# Patient Record
Sex: FEMALE | Race: ASIAN | NOT HISPANIC OR LATINO | URBAN - METROPOLITAN AREA
[De-identification: names, ages, dates, MRNs, and addresses within clinical notes are randomized per-mention and may not be internally consistent; named-entity substitution may affect disease eponyms.]

---

## 2023-05-23 ENCOUNTER — INPATIENT (INPATIENT)
Facility: HOSPITAL | Age: 68
LOS: 9 days | Discharge: NURSING, REHAB OR LONG-TERM CARE | DRG: 536 | End: 2023-06-02
Attending: HOSPITALIST | Admitting: STUDENT IN AN ORGANIZED HEALTH CARE EDUCATION/TRAINING PROGRAM
Payer: MEDICARE

## 2023-05-23 VITALS
DIASTOLIC BLOOD PRESSURE: 85 MMHG | RESPIRATION RATE: 18 BRPM | HEART RATE: 80 BPM | TEMPERATURE: 98 F | SYSTOLIC BLOOD PRESSURE: 168 MMHG | OXYGEN SATURATION: 98 %

## 2023-05-23 LAB
ALBUMIN SERPL ELPH-MCNC: 3.7 G/DL — SIGNIFICANT CHANGE UP (ref 3.3–5)
ALP SERPL-CCNC: 97 U/L — SIGNIFICANT CHANGE UP (ref 40–120)
ALT FLD-CCNC: 24 U/L — SIGNIFICANT CHANGE UP (ref 12–78)
ANION GAP SERPL CALC-SCNC: 6 MMOL/L — SIGNIFICANT CHANGE UP (ref 5–17)
APPEARANCE UR: CLEAR — SIGNIFICANT CHANGE UP
APTT BLD: 33.4 SEC — SIGNIFICANT CHANGE UP (ref 27.5–35.5)
AST SERPL-CCNC: 33 U/L — SIGNIFICANT CHANGE UP (ref 15–37)
BACTERIA # UR AUTO: ABNORMAL
BILIRUB SERPL-MCNC: 1 MG/DL — SIGNIFICANT CHANGE UP (ref 0.2–1.2)
BILIRUB UR-MCNC: NEGATIVE — SIGNIFICANT CHANGE UP
BLD GP AB SCN SERPL QL: SIGNIFICANT CHANGE UP
BUN SERPL-MCNC: 19 MG/DL — SIGNIFICANT CHANGE UP (ref 7–23)
CALCIUM SERPL-MCNC: 9.3 MG/DL — SIGNIFICANT CHANGE UP (ref 8.5–10.1)
CHLORIDE SERPL-SCNC: 105 MMOL/L — SIGNIFICANT CHANGE UP (ref 96–108)
CK SERPL-CCNC: 346 U/L — HIGH (ref 26–192)
CO2 SERPL-SCNC: 24 MMOL/L — SIGNIFICANT CHANGE UP (ref 22–31)
COLOR SPEC: YELLOW — SIGNIFICANT CHANGE UP
CREAT SERPL-MCNC: 0.67 MG/DL — SIGNIFICANT CHANGE UP (ref 0.5–1.3)
DIFF PNL FLD: NEGATIVE — SIGNIFICANT CHANGE UP
EGFR: 96 ML/MIN/1.73M2 — SIGNIFICANT CHANGE UP
EPI CELLS # UR: SIGNIFICANT CHANGE UP
GLUCOSE SERPL-MCNC: 109 MG/DL — HIGH (ref 70–99)
GLUCOSE UR QL: NEGATIVE — SIGNIFICANT CHANGE UP
HCT VFR BLD CALC: 27.9 % — LOW (ref 34.5–45)
HGB BLD-MCNC: 8.7 G/DL — LOW (ref 11.5–15.5)
INR BLD: 1.11 RATIO — SIGNIFICANT CHANGE UP (ref 0.88–1.16)
KETONES UR-MCNC: NEGATIVE — SIGNIFICANT CHANGE UP
LEUKOCYTE ESTERASE UR-ACNC: ABNORMAL
MCHC RBC-ENTMCNC: 27.7 PG — SIGNIFICANT CHANGE UP (ref 27–34)
MCHC RBC-ENTMCNC: 31.2 GM/DL — LOW (ref 32–36)
MCV RBC AUTO: 88.9 FL — SIGNIFICANT CHANGE UP (ref 80–100)
NITRITE UR-MCNC: NEGATIVE — SIGNIFICANT CHANGE UP
PH UR: 5 — SIGNIFICANT CHANGE UP (ref 5–8)
PLATELET # BLD AUTO: 167 K/UL — SIGNIFICANT CHANGE UP (ref 150–400)
POTASSIUM SERPL-MCNC: 3.7 MMOL/L — SIGNIFICANT CHANGE UP (ref 3.5–5.3)
POTASSIUM SERPL-SCNC: 3.7 MMOL/L — SIGNIFICANT CHANGE UP (ref 3.5–5.3)
PROT SERPL-MCNC: 8.2 G/DL — SIGNIFICANT CHANGE UP (ref 6–8.3)
PROT UR-MCNC: NEGATIVE — SIGNIFICANT CHANGE UP
PROTHROM AB SERPL-ACNC: 13 SEC — SIGNIFICANT CHANGE UP (ref 10.5–13.4)
RBC # BLD: 3.14 M/UL — LOW (ref 3.8–5.2)
RBC # FLD: 17.5 % — HIGH (ref 10.3–14.5)
RBC CASTS # UR COMP ASSIST: ABNORMAL /HPF (ref 0–4)
SODIUM SERPL-SCNC: 135 MMOL/L — SIGNIFICANT CHANGE UP (ref 135–145)
SP GR SPEC: 1.01 — SIGNIFICANT CHANGE UP (ref 1.01–1.02)
UROBILINOGEN FLD QL: NEGATIVE — SIGNIFICANT CHANGE UP
WBC # BLD: 20.9 K/UL — HIGH (ref 3.8–10.5)
WBC # FLD AUTO: 20.9 K/UL — HIGH (ref 3.8–10.5)
WBC UR QL: SIGNIFICANT CHANGE UP

## 2023-05-23 PROCEDURE — 73552 X-RAY EXAM OF FEMUR 2/>: CPT | Mod: 26,RT

## 2023-05-23 PROCEDURE — 99285 EMERGENCY DEPT VISIT HI MDM: CPT

## 2023-05-23 PROCEDURE — G1004: CPT

## 2023-05-23 PROCEDURE — 72125 CT NECK SPINE W/O DYE: CPT | Mod: 26,MG

## 2023-05-23 PROCEDURE — 93010 ELECTROCARDIOGRAM REPORT: CPT

## 2023-05-23 PROCEDURE — 71045 X-RAY EXAM CHEST 1 VIEW: CPT | Mod: 26

## 2023-05-23 PROCEDURE — 73030 X-RAY EXAM OF SHOULDER: CPT | Mod: 26,RT

## 2023-05-23 PROCEDURE — 73502 X-RAY EXAM HIP UNI 2-3 VIEWS: CPT | Mod: 26,RT

## 2023-05-23 RX ORDER — MORPHINE SULFATE 50 MG/1
4 CAPSULE, EXTENDED RELEASE ORAL ONCE
Refills: 0 | Status: DISCONTINUED | OUTPATIENT
Start: 2023-05-23 | End: 2023-05-23

## 2023-05-23 RX ORDER — SODIUM CHLORIDE 9 MG/ML
1000 INJECTION INTRAMUSCULAR; INTRAVENOUS; SUBCUTANEOUS ONCE
Refills: 0 | Status: COMPLETED | OUTPATIENT
Start: 2023-05-23 | End: 2023-05-23

## 2023-05-23 RX ADMIN — MORPHINE SULFATE 4 MILLIGRAM(S): 50 CAPSULE, EXTENDED RELEASE ORAL at 23:29

## 2023-05-23 RX ADMIN — MORPHINE SULFATE 4 MILLIGRAM(S): 50 CAPSULE, EXTENDED RELEASE ORAL at 23:44

## 2023-05-23 RX ADMIN — SODIUM CHLORIDE 1000 MILLILITER(S): 9 INJECTION INTRAMUSCULAR; INTRAVENOUS; SUBCUTANEOUS at 23:11

## 2023-05-23 NOTE — ED ADULT NURSE NOTE - OBJECTIVE STATEMENT
Stated she fell over a hose at home in front of her home on concrete> unable to recall if she hit head>denies pain to this area, denies LOC  but she fell on right side and was unable to stand. Complaining of pain in right neck, right shoulder, and right  buttock. Pt stated she takes aspirin> h/o valve replacement> stated she too tylenol 500 mg before arrival

## 2023-05-23 NOTE — ED PROVIDER NOTE - DIFFERENTIAL DIAGNOSIS
Rule out right hip fracture, acute traumatic head injury, cervical spine fracture, other acute pathology Differential Diagnosis

## 2023-05-23 NOTE — ED PROVIDER NOTE - CLINICAL SUMMARY MEDICAL DECISION MAKING FREE TEXT BOX
Right hip injury, head injury status post mechanical fall.  Will check labs, x-ray, CT, orthopedics, admission

## 2023-05-23 NOTE — ED ADULT NURSE NOTE - NSFALLRISKINTERV_ED_ALL_ED
Assistance OOB with selected safe patient handling equipment if applicable/Assistance with ambulation/Communicate fall risk and risk factors to all staff, patient, and family/Monitor gait and stability/Provide visual cue: yellow wristband, yellow gown, etc/Reinforce activity limits and safety measures with patient and family/Toileting schedule using arm’s reach rule for commode and bathroom/Call bell, personal items and telephone in reach/Instruct patient to call for assistance before getting out of bed/chair/stretcher/Non-slip footwear applied when patient is off stretcher/[place] to call system/Physically safe environment - no spills, clutter or unnecessary equipment/Purposeful Proactive Rounding/Room/bathroom lighting operational, light cord in reach

## 2023-05-23 NOTE — ED ADULT NURSE NOTE - ABDOMEN
What Type Of Note Output Would You Prefer (Optional)?: Bullet Format How Severe Is Your Skin Lesion?: mild Has Your Skin Lesion Been Treated?: not been treated Is This A New Presentation, Or A Follow-Up?: Skin Lesion Additional History: Last seen October 2019 soft/nondistended

## 2023-05-23 NOTE — ED PROVIDER NOTE - PHYSICAL EXAMINATION
· CONSTITUTIONAL: Well appearing, well nourished, awake, alert, oriented to person, place, time/situation and in no apparent distress. non-toxic, well appearing.   · ENMT: Airway patent, Nasal mucosa clear. Mouth with normal mucosa. Throat has no vesicles, no oropharyngeal exudates and uvula is midline. MM moist.  no external signs of head trauma.  · EYES: Clear bilaterally, pupils equal, round and reactive to light. Extra-ocular muscles intact.  · CARDIAC: Normal rate, regular rhythm.  Heart sounds S1, S2.  No murmurs, rubs or gallops.  · RESPIRATORY: Breath sounds clear and equal bilaterally. nl resp effort.  No Wheeze / Rhonci / Rales.  · GASTROINTESTINAL: Abdomen soft, non-tender, no guarding. non-distended. no hsm. no CVA tenderness. no acute signs of truncal trauma.  · GENITOURINARY:  Bladder: non-tender / non-distended  · MUSCULOSKELETAL: Spine appears normal, No spinal tenderness (Cervical, thoracic, Lumbar). Range of motion in all extremities is not limited, no muscle or joint tenderness except as noted  Positive tenderness to right hip, medial aspect with decreased range of motion due to pain.  Normal distal femur, knee, tib-fib.  Mild pain with full range of motion of right shoulder.  Normal clavicle bilaterally.  · NEUROLOGICAL: Alert and oriented, no focal deficits, no motor or sensory deficits. Normal, non-focal detailed neurologic exam.  · SKIN: Skin normal color for race, warm, dry and intact. No evidence of rash.  No external signs of head trauma, minimal tenderness to right posterior lateral scalp.

## 2023-05-23 NOTE — ED PROVIDER NOTE - OBJECTIVE STATEMENT
68-year-old female with a history of hypertension, status post aortic valve replacement-bovine presents with mechanical fall this afternoon.  Patient tripped over a hose, and fell on her right side.  Patient possibly hit her head.  Patient complains of mild headache and neck pain on the right side.  Patient complains of right hip pain, and has been unable to ambulate due to pain in the hip.  No numbness/tingling/focal weakness.  No acute back pain.  No chest pain or shortness of breath.  No abdominal pain or flank pain.  No aggravating or alleviating factors otherwise noted.  No other acute injury or complaints.  Patient previously vaccinated for COVID.

## 2023-05-23 NOTE — ED ADULT TRIAGE NOTE - CHIEF COMPLAINT QUOTE
per ems from home, tripped over hose outside, was on the ground for about two hours per family until  found pt. pt reports right sided hip pain. denies LOC, denies headstrike

## 2023-05-24 DIAGNOSIS — D64.9 ANEMIA, UNSPECIFIED: ICD-10-CM

## 2023-05-24 DIAGNOSIS — I10 ESSENTIAL (PRIMARY) HYPERTENSION: ICD-10-CM

## 2023-05-24 DIAGNOSIS — Z29.9 ENCOUNTER FOR PROPHYLACTIC MEASURES, UNSPECIFIED: ICD-10-CM

## 2023-05-24 DIAGNOSIS — Z95.2 PRESENCE OF PROSTHETIC HEART VALVE: Chronic | ICD-10-CM

## 2023-05-24 DIAGNOSIS — D72.829 ELEVATED WHITE BLOOD CELL COUNT, UNSPECIFIED: ICD-10-CM

## 2023-05-24 DIAGNOSIS — S32.591A OTHER SPECIFIED FRACTURE OF RIGHT PUBIS, INITIAL ENCOUNTER FOR CLOSED FRACTURE: ICD-10-CM

## 2023-05-24 DIAGNOSIS — S32.599A OTHER SPECIFIED FRACTURE OF UNSPECIFIED PUBIS, INITIAL ENCOUNTER FOR CLOSED FRACTURE: ICD-10-CM

## 2023-05-24 DIAGNOSIS — R74.8 ABNORMAL LEVELS OF OTHER SERUM ENZYMES: ICD-10-CM

## 2023-05-24 LAB
ANION GAP SERPL CALC-SCNC: 3 MMOL/L — LOW (ref 5–17)
ANISOCYTOSIS BLD QL: SLIGHT — SIGNIFICANT CHANGE UP
BASOPHILS # BLD AUTO: 0 K/UL — SIGNIFICANT CHANGE UP (ref 0–0.2)
BASOPHILS # BLD AUTO: 0.09 K/UL — SIGNIFICANT CHANGE UP (ref 0–0.2)
BASOPHILS NFR BLD AUTO: 0 % — SIGNIFICANT CHANGE UP (ref 0–2)
BASOPHILS NFR BLD AUTO: 0.6 % — SIGNIFICANT CHANGE UP (ref 0–2)
BUN SERPL-MCNC: 13 MG/DL — SIGNIFICANT CHANGE UP (ref 7–23)
CALCIUM SERPL-MCNC: 8.8 MG/DL — SIGNIFICANT CHANGE UP (ref 8.5–10.1)
CHLORIDE SERPL-SCNC: 109 MMOL/L — HIGH (ref 96–108)
CK SERPL-CCNC: 294 U/L — HIGH (ref 26–192)
CO2 SERPL-SCNC: 27 MMOL/L — SIGNIFICANT CHANGE UP (ref 22–31)
CREAT SERPL-MCNC: 0.69 MG/DL — SIGNIFICANT CHANGE UP (ref 0.5–1.3)
EGFR: 95 ML/MIN/1.73M2 — SIGNIFICANT CHANGE UP
ELLIPTOCYTES BLD QL SMEAR: SLIGHT — SIGNIFICANT CHANGE UP
EOSINOPHIL # BLD AUTO: 0 K/UL — SIGNIFICANT CHANGE UP (ref 0–0.5)
EOSINOPHIL # BLD AUTO: 0.12 K/UL — SIGNIFICANT CHANGE UP (ref 0–0.5)
EOSINOPHIL NFR BLD AUTO: 0 % — SIGNIFICANT CHANGE UP (ref 0–6)
EOSINOPHIL NFR BLD AUTO: 0.7 % — SIGNIFICANT CHANGE UP (ref 0–6)
FERRITIN SERPL-MCNC: 1401 NG/ML — HIGH (ref 15–150)
FOLATE SERPL-MCNC: >20 NG/ML — SIGNIFICANT CHANGE UP
GLUCOSE SERPL-MCNC: 102 MG/DL — HIGH (ref 70–99)
HCT VFR BLD CALC: 26.4 % — LOW (ref 34.5–45)
HGB BLD-MCNC: 8 G/DL — LOW (ref 11.5–15.5)
HYPOCHROMIA BLD QL: SLIGHT — SIGNIFICANT CHANGE UP
IMM GRANULOCYTES NFR BLD AUTO: 0.9 % — SIGNIFICANT CHANGE UP (ref 0–0.9)
IRON SATN MFR SERPL: 20 % — SIGNIFICANT CHANGE UP (ref 14–50)
IRON SATN MFR SERPL: 39 UG/DL — SIGNIFICANT CHANGE UP (ref 30–160)
LYMPHOCYTES # BLD AUTO: 1.46 K/UL — SIGNIFICANT CHANGE UP (ref 1–3.3)
LYMPHOCYTES # BLD AUTO: 13.8 % — SIGNIFICANT CHANGE UP (ref 13–44)
LYMPHOCYTES # BLD AUTO: 2.23 K/UL — SIGNIFICANT CHANGE UP (ref 1–3.3)
LYMPHOCYTES # BLD AUTO: 7 % — LOW (ref 13–44)
MACROCYTES BLD QL: SLIGHT — SIGNIFICANT CHANGE UP
MANUAL SMEAR VERIFICATION: SIGNIFICANT CHANGE UP
MCHC RBC-ENTMCNC: 27.1 PG — SIGNIFICANT CHANGE UP (ref 27–34)
MCHC RBC-ENTMCNC: 30.3 GM/DL — LOW (ref 32–36)
MCV RBC AUTO: 89.5 FL — SIGNIFICANT CHANGE UP (ref 80–100)
MONOCYTES # BLD AUTO: 1.52 K/UL — HIGH (ref 0–0.9)
MONOCYTES # BLD AUTO: 1.67 K/UL — HIGH (ref 0–0.9)
MONOCYTES NFR BLD AUTO: 8 % — SIGNIFICANT CHANGE UP (ref 2–14)
MONOCYTES NFR BLD AUTO: 9.4 % — SIGNIFICANT CHANGE UP (ref 2–14)
NEUTROPHILS # BLD AUTO: 12.11 K/UL — HIGH (ref 1.8–7.4)
NEUTROPHILS # BLD AUTO: 17.77 K/UL — HIGH (ref 1.8–7.4)
NEUTROPHILS NFR BLD AUTO: 74.6 % — SIGNIFICANT CHANGE UP (ref 43–77)
NEUTROPHILS NFR BLD AUTO: 79 % — HIGH (ref 43–77)
NEUTS BAND # BLD: 6 % — SIGNIFICANT CHANGE UP (ref 0–8)
NRBC # BLD: 0 /100 WBCS — SIGNIFICANT CHANGE UP (ref 0–0)
NRBC # BLD: 0 — SIGNIFICANT CHANGE UP
NRBC # BLD: SIGNIFICANT CHANGE UP /100 WBCS (ref 0–0)
PLAT MORPH BLD: NORMAL — SIGNIFICANT CHANGE UP
PLATELET # BLD AUTO: 148 K/UL — LOW (ref 150–400)
POIKILOCYTOSIS BLD QL AUTO: SLIGHT — SIGNIFICANT CHANGE UP
POTASSIUM SERPL-MCNC: 3.7 MMOL/L — SIGNIFICANT CHANGE UP (ref 3.5–5.3)
POTASSIUM SERPL-SCNC: 3.7 MMOL/L — SIGNIFICANT CHANGE UP (ref 3.5–5.3)
RBC # BLD: 2.95 M/UL — LOW (ref 3.8–5.2)
RBC # BLD: 2.95 M/UL — LOW (ref 3.8–5.2)
RBC # FLD: 17.7 % — HIGH (ref 10.3–14.5)
RBC BLD AUTO: SIGNIFICANT CHANGE UP
RETICS #: 72.6 K/UL — SIGNIFICANT CHANGE UP (ref 25–125)
RETICS/RBC NFR: 2.5 % — SIGNIFICANT CHANGE UP (ref 0.5–2.5)
SODIUM SERPL-SCNC: 139 MMOL/L — SIGNIFICANT CHANGE UP (ref 135–145)
TIBC SERPL-MCNC: 197 UG/DL — LOW (ref 220–430)
TRANSFERRIN SERPL-MCNC: 171 MG/DL — LOW (ref 200–360)
UIBC SERPL-MCNC: 159 UG/DL — SIGNIFICANT CHANGE UP (ref 110–370)
VIT B12 SERPL-MCNC: 1979 PG/ML — HIGH (ref 232–1245)
WBC # BLD: 16.21 K/UL — HIGH (ref 3.8–10.5)
WBC # FLD AUTO: 16.21 K/UL — HIGH (ref 3.8–10.5)

## 2023-05-24 PROCEDURE — 73110 X-RAY EXAM OF WRIST: CPT | Mod: 26,RT

## 2023-05-24 PROCEDURE — 72190 X-RAY EXAM OF PELVIS: CPT | Mod: 26

## 2023-05-24 PROCEDURE — 76376 3D RENDER W/INTRP POSTPROCES: CPT | Mod: 26

## 2023-05-24 PROCEDURE — 99223 1ST HOSP IP/OBS HIGH 75: CPT | Mod: GC

## 2023-05-24 PROCEDURE — 71045 X-RAY EXAM CHEST 1 VIEW: CPT | Mod: 26

## 2023-05-24 PROCEDURE — 73120 X-RAY EXAM OF HAND: CPT | Mod: 26,RT

## 2023-05-24 PROCEDURE — 72192 CT PELVIS W/O DYE: CPT | Mod: 26,MA

## 2023-05-24 PROCEDURE — 70450 CT HEAD/BRAIN W/O DYE: CPT | Mod: 26,MG

## 2023-05-24 PROCEDURE — G1004: CPT

## 2023-05-24 RX ORDER — LISINOPRIL 2.5 MG/1
10 TABLET ORAL DAILY
Refills: 0 | Status: DISCONTINUED | OUTPATIENT
Start: 2023-05-24 | End: 2023-06-02

## 2023-05-24 RX ORDER — LISINOPRIL 2.5 MG/1
1 TABLET ORAL
Refills: 0 | DISCHARGE

## 2023-05-24 RX ORDER — MORPHINE SULFATE 50 MG/1
2 CAPSULE, EXTENDED RELEASE ORAL EVERY 6 HOURS
Refills: 0 | Status: DISCONTINUED | OUTPATIENT
Start: 2023-05-24 | End: 2023-05-24

## 2023-05-24 RX ORDER — FERROUS SULFATE 325(65) MG
325 TABLET ORAL DAILY
Refills: 0 | Status: DISCONTINUED | OUTPATIENT
Start: 2023-05-24 | End: 2023-06-02

## 2023-05-24 RX ORDER — METOPROLOL TARTRATE 50 MG
25 TABLET ORAL DAILY
Refills: 0 | Status: DISCONTINUED | OUTPATIENT
Start: 2023-05-24 | End: 2023-06-02

## 2023-05-24 RX ORDER — BACITRACIN ZINC 500 UNIT/G
1 OINTMENT IN PACKET (EA) TOPICAL ONCE
Refills: 0 | Status: COMPLETED | OUTPATIENT
Start: 2023-05-24 | End: 2023-05-25

## 2023-05-24 RX ORDER — MORPHINE SULFATE 50 MG/1
1 CAPSULE, EXTENDED RELEASE ORAL EVERY 6 HOURS
Refills: 0 | Status: DISCONTINUED | OUTPATIENT
Start: 2023-05-24 | End: 2023-05-25

## 2023-05-24 RX ORDER — ACETAMINOPHEN 500 MG
650 TABLET ORAL EVERY 6 HOURS
Refills: 0 | Status: DISCONTINUED | OUTPATIENT
Start: 2023-05-24 | End: 2023-06-02

## 2023-05-24 RX ORDER — METOPROLOL TARTRATE 50 MG
1 TABLET ORAL
Refills: 0 | DISCHARGE

## 2023-05-24 RX ORDER — FERROUS SULFATE 325(65) MG
1 TABLET ORAL
Refills: 0 | DISCHARGE

## 2023-05-24 RX ORDER — ENOXAPARIN SODIUM 100 MG/ML
40 INJECTION SUBCUTANEOUS EVERY 24 HOURS
Refills: 0 | Status: DISCONTINUED | OUTPATIENT
Start: 2023-05-24 | End: 2023-05-27

## 2023-05-24 RX ORDER — LANOLIN ALCOHOL/MO/W.PET/CERES
3 CREAM (GRAM) TOPICAL AT BEDTIME
Refills: 0 | Status: DISCONTINUED | OUTPATIENT
Start: 2023-05-24 | End: 2023-06-02

## 2023-05-24 RX ORDER — ONDANSETRON 8 MG/1
4 TABLET, FILM COATED ORAL EVERY 8 HOURS
Refills: 0 | Status: DISCONTINUED | OUTPATIENT
Start: 2023-05-24 | End: 2023-06-02

## 2023-05-24 RX ORDER — SODIUM CHLORIDE 9 MG/ML
1000 INJECTION INTRAMUSCULAR; INTRAVENOUS; SUBCUTANEOUS
Refills: 0 | Status: DISCONTINUED | OUTPATIENT
Start: 2023-05-24 | End: 2023-05-25

## 2023-05-24 RX ORDER — MORPHINE SULFATE 50 MG/1
4 CAPSULE, EXTENDED RELEASE ORAL ONCE
Refills: 0 | Status: DISCONTINUED | OUTPATIENT
Start: 2023-05-24 | End: 2023-05-24

## 2023-05-24 RX ADMIN — MORPHINE SULFATE 2 MILLIGRAM(S): 50 CAPSULE, EXTENDED RELEASE ORAL at 12:15

## 2023-05-24 RX ADMIN — Medication 650 MILLIGRAM(S): at 21:07

## 2023-05-24 RX ADMIN — MORPHINE SULFATE 4 MILLIGRAM(S): 50 CAPSULE, EXTENDED RELEASE ORAL at 01:37

## 2023-05-24 RX ADMIN — Medication 25 MILLIGRAM(S): at 06:09

## 2023-05-24 RX ADMIN — LISINOPRIL 10 MILLIGRAM(S): 2.5 TABLET ORAL at 06:09

## 2023-05-24 RX ADMIN — ENOXAPARIN SODIUM 40 MILLIGRAM(S): 100 INJECTION SUBCUTANEOUS at 06:09

## 2023-05-24 RX ADMIN — Medication 650 MILLIGRAM(S): at 21:37

## 2023-05-24 RX ADMIN — Medication 325 MILLIGRAM(S): at 11:22

## 2023-05-24 RX ADMIN — SODIUM CHLORIDE 75 MILLILITER(S): 9 INJECTION INTRAMUSCULAR; INTRAVENOUS; SUBCUTANEOUS at 03:39

## 2023-05-24 RX ADMIN — MORPHINE SULFATE 2 MILLIGRAM(S): 50 CAPSULE, EXTENDED RELEASE ORAL at 11:53

## 2023-05-24 NOTE — OCCUPATIONAL THERAPY INITIAL EVALUATION ADULT - GENERAL OBSERVATIONS, REHAB EVAL
Pt received supine in bed (+) external catheter; medicated for pain per RN and pt agrees to participate with OT for eval.

## 2023-05-24 NOTE — CHART NOTE - NSCHARTNOTEFT_GEN_A_CORE
Hospitalist Attending Chart Update / Progress Note    Please see H&P written early today by Dr. Navarro, for more information.     Pt seen, interviewed, and examined by me.   Pt reported pelvic pain is minimal when she is at rest, but exacerbated by walking. Pt also reported right hand/wrist bruising and achiness.   Aside from that, pt reported feeling well with no focal symptoms. Physical therapy evaluated the pt and rec [facility]. Pt is interested in pursuing T[facility] and SW will provide choices this afternoon.  Ordered xray of pt's right hand/wrist to r/out fracture -- will f/up.   So far xrays/CTs have shown acute superior+inferior right pubic rami fractures, as well as, right sacral ala fracture. No femoral fracture and no shoulder fracture/dislocation. Per ortho, pt can be WBAT on right LE and no surgical intervention is recommended. Outpt f/up.   Pt's leukocytosis is downtrending - suspected to be due to acute stress / multiple fractures. No sign of infection on ROS/PE. Monitor CBC and VS. Hospitalist Attending Chart Update / Progress Note    Please see H&P written early today by Dr. Navarro, for more information.      Pt seen, interviewed, and examined by me.   Pt reported pelvic pain is minimal when she is at rest, but exacerbated by walking. Pt also reported right hand/wrist bruising and achiness.   Aside from that, pt reported feeling well with no focal symptoms. Physical therapy evaluated the pt and rec T[facility]. Pt is interested in pursuing [facility] and SW will provide choices this afternoon.  Ordered xray of pt's right hand/wrist to r/out fracture -- will f/up.   So far xrays/CTs have shown acute superior+inferior right pubic rami fractures, as well as, right sacral ala fracture. No femoral fracture and no shoulder fracture/dislocation. Per ortho, pt can be WBAT on right LE and no surgical intervention is recommended. Outpt f/up.   Pt's leukocytosis is downtrending - suspected to be due to acute stress / multiple fractures. No sign of infection on ROS/PE. Monitor CBC and VS.

## 2023-05-24 NOTE — OCCUPATIONAL THERAPY INITIAL EVALUATION ADULT - RANGE OF MOTION EXAMINATION, UPPER EXTREMITY
bruising noted to right thenar eminence; gross/fine coordination grossly intact BUE/bilateral UE Active ROM was WFL  (within functional limits)

## 2023-05-24 NOTE — H&P ADULT - PROBLEM SELECTOR PLAN 2
H/H 8.7/27.9 in ED  - Unknown baseline  - No signs or symptoms of overt bleeding  - F/u iron studies  - Monitor CBC daily H/H 8.7/27.9 in ED  - Chronic  - continue iron supplement   - No signs or symptoms of overt bleeding  - F/u iron studies  - Monitor CBC daily H/H 8.7/27.9 in ED  - Chronic  - continue home iron supplement   - No signs or symptoms of overt bleeding  - F/u iron studies  - Monitor CBC daily

## 2023-05-24 NOTE — PHYSICAL THERAPY INITIAL EVALUATION ADULT - ADDITIONAL COMMENTS
Pt lives in a house w/ 3-4 steps/rail to enter.  Pt report once inside, pt stays on main level of house.  Pt is a community ambulator and is able to drive a car.

## 2023-05-24 NOTE — OCCUPATIONAL THERAPY INITIAL EVALUATION ADULT - DIAGNOSIS, OT EVAL
Pt with pelvic pain, generalized weakness, and decreased balance affecting safety and functional independence in ADLs and functional mobility.

## 2023-05-24 NOTE — OCCUPATIONAL THERAPY INITIAL EVALUATION ADULT - BED MOBILITY LIMITATIONS, REHAB EVAL
No excercise/No heavy lifting
decreased ability to use arms for pushing/pulling/decreased ability to use legs for bridging/pushing

## 2023-05-24 NOTE — OCCUPATIONAL THERAPY INITIAL EVALUATION ADULT - TRANSFER TRAINING, PT EVAL
Pt will improve sit to/from stands to modAx1 in prep for safe commode transfers within 2-5 sessions.

## 2023-05-24 NOTE — H&P ADULT - ATTENDING COMMENTS
69yo F w/pmhx anemia, HTN, s/p bovine aortic valve replacement, presents to the ED s/p fall. Admitted for pubic rami fx.    Agree with above. Edited where appropriate.

## 2023-05-24 NOTE — H&P ADULT - PROBLEM SELECTOR PLAN 1
Presents after mechanical fall. Possible head strike.  - XR pelvis shows fracture of pubic ramus, follow up official read  - Follow up official reads of XR R Hip/Femur/Shoulder, CT Head/Cervical Spine, and CT Pelvis  - S/p Morphine 4mg IVP x1, 1L NS bolus in ED  - Pain management:  - PT/OT consulted, f/u recs  - Ortho consulted, f/u recs, likely non-surgical management Presents after mechanical fall. Possible head strike.  - XR pelvis shows fracture of pubic ramus, follow up official read  - CT Head/Cervical Spine: No intracranial bleed, mass effect or depressed skull fracture. No acute fracture or acute subluxation  - Follow up official reads of XR R Hip/Femur/Shoulder, CT Pelvis  - S/p Morphine 4mg IVP x1, 1L NS bolus in ED  - Pain management: Tylenol 650 for mild pain, morphine 1mg for moderate and morphine 2 mg for severe   - PT/OT consulted, f/u recs  - Ortho consulted, f/u recs, likely non-surgical management

## 2023-05-24 NOTE — PHYSICAL THERAPY INITIAL EVALUATION ADULT - PERTINENT HX OF CURRENT PROBLEM, REHAB EVAL
69yo F w/pmhx anemia, HTN, s/p bovine aortic valve replacement, presents to the ED s/p fall on her right side and is unaware if she hit her head. Pt was unable to get up after the fall and remained on the ground for 2.5 hours. UA: trace LEC, 3-5 RBC, occasional bacteria. CT Pelvis: Acute nondisplaced fractures of the right superior and inferior pubic ramus. Acute comminuted, minimally depressed fracture of the right sacral ala. X-ray (R femur) advanced right knee degeneration. Right shoulder Mild degeneration of the joint. No fracture.

## 2023-05-24 NOTE — PATIENT PROFILE ADULT - FALL HARM RISK - RISK INTERVENTIONS

## 2023-05-24 NOTE — OCCUPATIONAL THERAPY INITIAL EVALUATION ADULT - PERTINENT HX OF CURRENT PROBLEM, REHAB EVAL
Spoke with pt and she stated verbalized understanding preferred pharmacy is [facility] in [place]    69 y/o F w/pmhx anemia, HTN, s/p bovine aortic valve replacement, presents to the ED s/p fall in her garden. Admitted for pubic rami fx. XR right shoulder and femur (-) fx.

## 2023-05-24 NOTE — CARE COORDINATION ASSESSMENT. - NSCAREPROVIDERS_GEN_ALL_CORE_FT
CARE PROVIDERS:  Accepting Physician: Carly Navarro  Administration: Adrian Taylor  Administration: Andreas Corbin  Administration: Noelle Colon  Admitting: Carly Navarro  Attending: Romario Dacosta  Consultant: Walker Graham  Covering Team: Carly Navarro  ED Attending: Josep Valencia  ED Nurse: Zora Gautam  Emergency Medicine: Josep Valencia  Nurse: Elizabet Samuel  Nurse: Christi Hahn  Nurse: Esther Thomson  Nurse: Ivonne Martinez  Nurse: Thomas Wolfe  Nurse: Natalia Terry  Occupational Therapy: Cherrie Clifton  Occupational Therapy: Lakia Fernando  Ordered: ADM, User  Ordered: ServiceAccount, SCMMLM  Ordered: ServiceAccount, SCMMLM  Ordered: ServiceAccount, SCMMLM  Ordered: ServiceAccount, SCMMLM  Override: EkChristi reina  Override: Thomas Wolfe  Override: Natalia Terry  Override: Esther Thomson  PCA/Nursing Assistant: Marcio Childs  Primary Team: Romario Dacosta  Primary Team: Andrea Larose  Primary Team: Quincy Juarez  Primary Team: Felicia Simon  Primary Team: Clemente Salvador  Registered Dietitian: Giselle Peterson  Respiratory Therapy: Lisa Franco   CARE PROVIDERS:  Accepting Physician: Carly Navarro  Administration: Adrian Taylor  Administration: Andreas Corbin  Admitting: Carly Navarro  Attending: Romario Dacosta  Consultant: Walker Graham  Covering Team: Carly Navarro  ED Attending: Josep Valencia  ED Nurse: Zora Gautam  Emergency Medicine: Josep Valencia  Nurse: Elizabet Samuel  Nurse: Christi Hahn  Nurse: Esther Thomson  Nurse: Ivonne Martinez  Nurse: Thomas Wolfe  Nurse: Natalia Terry  Occupational Therapy: Cherrie Clifton  Occupational Therapy: Lakia Fernando  Ordered: ADM, User  Ordered: ServiceAccount, SCMMLM  Ordered: ServiceAccount, SCMMLM  Ordered: ServiceAccount, SCMMLM  Ordered: ServiceAccount, SCMMLM  Override: EkChristi reina  Override: Thomas Wolfe  Override: Natalia Terry  Override: Esther Thomson  PCA/Nursing Assistant: Jaclyn Brooks  PCA/Nursing Assistant: Marcio Childs  Primary Team: Romario Dacosta  Primary Team: Andrea Larose  Primary Team: Quincy Juarez  Primary Team: Felicia Simon  Primary Team: Clemente Salvador  Registered Dietitian: Giselle Peterson  Respiratory Therapy: Lisa Franco

## 2023-05-24 NOTE — CARE COORDINATION ASSESSMENT. - OTHER PERTINENT REFERRAL INFORMATION
Spoke with patient at bedside. Patient states she lives w her spouse who had a stroke, and for which she provides care. She was independent PTA w/o a device, but owns a cane . She  fell outside gardening. She is awaiting a PTE, but is not adverse to [facility] if recommended as dispo. DC needs pending hospital course. CM to follow w social work

## 2023-05-24 NOTE — OCCUPATIONAL THERAPY INITIAL EVALUATION ADULT - ADDITIONAL COMMENTS
Pt lives with her  (h/o CVA, she assists with his care) in a private home with 4 steps to enter then bedroom/bathroom on 1 level. Bathroom has a stall shower and comfort height toilet. PTA pt was independent with ADLs, IADLs, and functional mobility without AD. (+) .

## 2023-05-24 NOTE — H&P ADULT - NSHPPHYSICALEXAM_GEN_ALL_CORE
T(C): 36.6 (05-23-23 @ 20:47), Max: 36.6 (05-23-23 @ 20:47)  HR: 80 (05-23-23 @ 20:47) (80 - 80)  BP: 168/85 (05-23-23 @ 20:47) (168/85 - 168/85)  RR: 18 (05-23-23 @ 20:47) (18 - 18)  SpO2: 98% (05-23-23 @ 20:47) (98% - 98%)    GENERAL: patient appears well, no acute distress, appropriate, pleasant  EYES: sclera clear, no exudates  ENMT: oropharynx clear without erythema, no exudates, moist mucous membranes  NECK: supple, soft  LUNGS: good air entry bilaterally, clear to auscultation, symmetric breath sounds, no wheezing or rhonchi appreciated  HEART: soft S1/S2, regular rate and rhythm, no murmurs noted, no lower extremity edema  GASTROINTESTINAL: abdomen is soft, nontender, nondistended, normoactive bowel sounds, no palpable masses  INTEGUMENT: good skin turgor, no lesions noted  MUSCULOSKELETAL: no clubbing or cyanosis, no obvious deformity  NEUROLOGIC: awake, alert, oriented x3, good muscle tone in 4 extremities, no obvious sensory deficits  PSYCHIATRIC: mood is good, affect is congruent, linear and logical thought process  HEME/LYMPH: no obvious ecchymosis or petechiae T(C): 36.6 (05-23-23 @ 20:47), Max: 36.6 (05-23-23 @ 20:47)  HR: 80 (05-23-23 @ 20:47) (80 - 80)  BP: 168/85 (05-23-23 @ 20:47) (168/85 - 168/85)  RR: 18 (05-23-23 @ 20:47) (18 - 18)  SpO2: 98% (05-23-23 @ 20:47) (98% - 98%)    GENERAL: patient appears well, no acute distress, appropriate, pleasant, obese  EYES: sclera clear, no exudates  ENMT: oropharynx clear without erythema, no exudates, moist mucous membranes  NECK: supple, soft  LUNGS: good air entry bilaterally, clear to auscultation, symmetric breath sounds, no wheezing or rhonchi appreciated  HEART: soft S1/S2, regular rate and rhythm, +systolic murmur heard throughout precordium, no lower extremity edema  GASTROINTESTINAL: abdomen is soft, nontender, nondistended, normoactive bowel sounds, no palpable masses  INTEGUMENT: good skin turgor, no lesions noted  MUSCULOSKELETAL: no clubbing or cyanosis, no obvious deformity  NEUROLOGIC: awake, alert, oriented x3, good muscle tone in 4 extremities, no obvious sensory deficits  PSYCHIATRIC: mood is good, affect is congruent, linear and logical thought process  HEME/LYMPH: no obvious ecchymosis or petechiae T(C): 36.6 (05-23-23 @ 20:47), Max: 36.6 (05-23-23 @ 20:47)  HR: 80 (05-23-23 @ 20:47) (80 - 80)  BP: 168/85 (05-23-23 @ 20:47) (168/85 - 168/85)  RR: 18 (05-23-23 @ 20:47) (18 - 18)  SpO2: 98% (05-23-23 @ 20:47) (98% - 98%)  GENERAL: patient appears well, no acute distress, appropriate, pleasant, obese  EYES: sclera clear, no exudates  ENMT: oropharynx clear without erythema, no exudates, moist mucous membranes  NECK: supple, soft  LUNGS: good air entry bilaterally, clear to auscultation, symmetric breath sounds, no wheezing or rhonchi appreciated  HEART: soft S1/S2, regular rate and rhythm, +systolic murmur heard throughout precordium, no lower extremity edema  GASTROINTESTINAL: abdomen is soft, nontender, nondistended, normoactive bowel sounds, no palpable masses  INTEGUMENT: good skin turgor, no lesions noted  MUSCULOSKELETAL: no clubbing or cyanosis, no obvious deformity  NEUROLOGIC: awake, alert, oriented x3, good muscle tone in 4 extremities, no obvious sensory deficits  PSYCHIATRIC: mood is good, affect is congruent, linear and logical thought process  HEME/LYMPH: no obvious ecchymosis or petechiae

## 2023-05-24 NOTE — ED ADULT NURSE REASSESSMENT NOTE - NSFALLRISKINTERV_ED_ALL_ED

## 2023-05-24 NOTE — CONSULT NOTE ADULT - SUBJECTIVE AND OBJECTIVE BOX
Patient is a 68yFemale community ambulator without assistive devices who presents to [facility] ED w/ a c/o of R hip after a mechanical fall. Patient states she tripped in her garden and was not able to get up. Pt's  found her on the ground 2 hours later and asked neighbors for help. Denies HH/LOC. Only endorses pain by R groin at this time. States inability to walk immediately following the injury. Denies any numbness or tingling. Denies having any other pain elsewhere. Denies any previous orthopedic history. No other orthopedic concerns at this time.    Hypertension    Anemia            No Known Allergies      PHYSICAL EXAM:  T(C): 36.7 (05-24-23 @ 04:51), Max: 37.1 (05-24-23 @ 03:45)  HR: 98 (05-24-23 @ 04:51) (80 - 98)  BP: 119/72 (05-24-23 @ 04:51) (110/80 - 168/85)  RR: 18 (05-24-23 @ 04:51) (18 - 18)  SpO2: 96% (05-24-23 @ 04:51) (96% - 98%)    Gen: NAD, Resting comfortably    RLE:  Skin intact, no erythema or ecchymosis  Discomfort by R groin, but otherwise no bony tenderness to palpation along RLE\  Hip ROM limited due to pain  Unable to fully SLR due to pain  No pain with axial load or log roll  +EHL/FHL/TA/GSC  +SILT L2-S1  + DP  Compartments soft and compressible  No calf tenderness    Secondary Assessment:  NC/AT, NTTP of clavicles, NTTP of C-,T-,L-Spine  UEs: R hand ecchymoses no open skin or TTP by hand or wrist. NTTP of Shoulders, Elbows, Wrists, Hands; NT with AROM/PROM of Shoulders, Elbows, Wrists, Hands; AIN/PIN/Med/Uln/Msc/Rad/Ax intact  LLE: Able to SLR, NT with Log Roll, NT with Heel Strike, NTTP of Hip, Knee, Ankle, foot; NT with AROM/PROM of Hip, Knee, Ankle, foot; Q/H/Gsc/TA/EHL/FHL intact    Imaging:   CT Pelvis:   1. Acute nondisplaced fractures of the right superior and inferior pubic   ramus.  2. Acute comminuted, minimally depressed fracture of the right sacral ala.    A/P: 68F with R sup/inf pubic rami fractures with R sacral ala fractures, consistent with LC1 injury    Analgesia as needed  WBAT  DVT ppx per primary team  PT/OT eval  Ice as tolerated  Obtain additional pelvic x-rays (inlet/outlet and Judet views) prior to discharge   No acute orthopedic surgical intervention indicated at this time  Orthopedically stable for DC  Follow up with Dr. Land in the office in 2 weeks, call for appointment   Will discuss with Dr. Land and adjust plan as needed

## 2023-05-24 NOTE — PATIENT PROFILE ADULT - NSTOBACCONEVERSMOKERY/N_GEN_A
Called patient and updated on prescription being sent to pharmacy. Explained that if are doesn't improve she will need to be seen in office. Patient  voices understanding .   No questions or concerns No

## 2023-05-24 NOTE — CHART NOTE - NSCHARTNOTEFT_GEN_A_CORE
Called by RN, pt with new fever 100.7F.  Patient asymptomatic, except for a mild headache. Patient also c/o scrape 2/2 to fall and would like some bacitracin ointment.     T(F): 100.7 (05-24-23 @ 20:52), Max: 100.7 (05-24-23 @ 20:52)  HR: 90 (05-24-23 @ 20:52) (83 - 90)  BP: 113/59 (05-24-23 @ 20:52) (113/59 - 131/75)  RR: 18 (05-24-23 @ 20:52) (18 - 20)  SpO2: 95% (05-24-23 @ 20:52) (95% - 98%)    Assessment/Plan: 69yo F w/pmhx anemia, HTN, s/p bovine aortic valve replacement, presents to the ED s/p fall. Admitted for pubic rami fx.   - RN gave PRN Tylenol  - Blood cultures, urine culture  - CXR   - Bacitracin for abrasion  - Will continue to monitor, RN to call if any changes

## 2023-05-24 NOTE — H&P ADULT - HISTORY OF PRESENT ILLNESS
69yo F w/pmhx HTN, s/p bovine aortic valve replacement, presents with mechanical fall.    ED Course:  Vitals: T 97.8, HR 80, /85, RR 18, spo2 98% on RA  Labs significant for: WBC 20.90 w/left shift, H/H 8.7/27.9,   UA: trace LEC, 3-5 RBC, occasional bacteria  ECG: NSR @ 92 BPM, possible L atrial enlargement, nonspecific ST abnormality, pending official read  CXR: Cardiomegaly, no acute lung pathology on personal read  XR R Hip/Femur/Shoulder:  CT Head/Cervical Spine:  CT Pelvis:    Received Morphine 4mg IVP x1, 1L NS bolus in ED 67yo F w/pmhx anemia, HTN, s/p bovine aortic valve replacement, presents to the ED s/p fall. Pt states that around 3:10 she went out to her backyard to plant onions when she tripped over the hose and fell onto the bricks in her backyard. Pt states that she fell on her right side and is unaware if she hit her head. She was unable to get up after the fall and remained on the ground for 2.5 hours. States that she called out for help; however, her  is hard of hearing and has hx of stroke so she is the primary care giver in the house. After a couple of hours, the  went to look for her and found her on the ground in the backyard. He tried to help her up with his cane but was unable. He then called his neighbors for help who then eventually called an ambulance and brought her to the hospital.    ED Course:  Vitals: T 97.8, HR 80, /85, RR 18, spo2 98% on RA  Labs significant for: WBC 20.90 w/left shift, H/H 8.7/27.9,   UA: trace LEC, 3-5 RBC, occasional bacteria  ECG: NSR @ 92 BPM, possible L atrial enlargement, nonspecific ST abnormality, pending official read  CXR: Cardiomegaly, no acute lung pathology on personal read  XR R Hip/Femur/Shoulder: f/u read   CT Head/Cervical Spine: No intracranial bleed, mass effect or depressed skull fracture. No acute fracture or acute subluxation  CT Pelvis: f/u read   Received Morphine 4mg IVP x1, 1L NS bolus in ED 69yo F w/pmhx anemia, HTN, s/p bovine aortic valve replacement, presents to the ED s/p fall. Pt states that around 3:10 she went out to her backyard to plant onions when she tripped over the hose and fell onto the bricks in her backyard. Pt states that she fell on her right side and is unaware if she hit her head. She was unable to get up after the fall and remained on the ground for 2.5 hours. States that she called out for help; however, her  is hard of hearing and has hx of stroke so she is the primary care giver in the house. After a couple of hours, the  went to look for her and found her on the ground in the backyard. He tried to help her up with his cane but was unable. He then called his neighbors for help who then eventually called an ambulance and brought her to the hospital.  ED Course:  Vitals: T 97.8, HR 80, /85, RR 18, spo2 98% on RA  Labs significant for: WBC 20.90 w/left shift, H/H 8.7/27.9,   UA: trace LEC, 3-5 RBC, occasional bacteria  ECG: NSR @ 92 BPM, possible L atrial enlargement, nonspecific ST abnormality, pending official read  CXR: Cardiomegaly, no acute lung pathology on personal read  XR R Hip/Femur/Shoulder: f/u read   CT Head/Cervical Spine: No intracranial bleed, mass effect or depressed skull fracture. No acute fracture or acute subluxation  CT Pelvis: f/u read   Received Morphine 4mg IVP x1, 1L NS bolus in ED

## 2023-05-24 NOTE — ED ADULT NURSE REASSESSMENT NOTE - NSFALLLASTDATE_ED_ALL_ED_DT
24-May-2023 18:00 Quality 402: Tobacco Use And Help With Quitting Among Adolescents: Patient screened for tobacco and never smoked Detail Level: Detailed

## 2023-05-24 NOTE — H&P ADULT - NSHPSOCIALHISTORY_GEN_ALL_CORE
Tobacco: None  EtOH:  None  Recreational drug use: None  Lives with:   Ambulates: Independently   ADLs: Independently

## 2023-05-24 NOTE — H&P ADULT - PROBLEM SELECTOR PLAN 4
, likely 2/2 being on ground for prolonged period of time  - S/p 1L NS bolus in ED , likely 2/2 being on ground for prolonged period of time  - S/p 1L NS bolus in ED  - continue NS 75cc/hr

## 2023-05-24 NOTE — PHYSICAL THERAPY INITIAL EVALUATION ADULT - ADL SKILLS, REHAB EVAL
independent Price (Use Numbers Only, No Special Characters Or $): 800 Price (Use Numbers Only, No Special Characters Or $): 095

## 2023-05-24 NOTE — H&P ADULT - NSHPREVIEWOFSYSTEMS_GEN_ALL_CORE
CONSTITUTIONAL: denies fever, chills, fatigue, weakness  HEENT: denies blurred vision, sore throat  SKIN: denies new lesions, rash  CARDIOVASCULAR: denies chest pain, chest pressure, palpitations  RESPIRATORY: denies shortness of breath, sputum production  GASTROINTESTINAL: denies nausea, vomiting, diarrhea, abdominal pain  GENITOURINARY: denies dysuria, discharge  NEUROLOGICAL: denies numbness, headache, focal weakness  MUSCULOSKELETAL: ++ joint pain, ++ neck pain, denies muscle aches  HEMATOLOGIC: denies gross bleeding, bruising  LYMPHATICS: denies enlarged lymph nodes, extremity swelling  PSYCHIATRIC: denies recent changes in anxiety, depression  ENDOCRINOLOGIC: denies sweating, cold or heat intolerance CONSTITUTIONAL: + headache, denies fever, chills, fatigue, weakness  HEENT: denies blurred vision, sore throat  SKIN: denies new lesions, rash  CARDIOVASCULAR: denies chest pain, chest pressure, palpitations  RESPIRATORY: denies shortness of breath, sputum production  GASTROINTESTINAL: denies nausea, vomiting, diarrhea, abdominal pain  GENITOURINARY: denies dysuria, discharge  NEUROLOGICAL: denies numbness, headache, focal weakness  MUSCULOSKELETAL: ++ joint pain, ++ neck pain, ++right hip and pubic rami pain  HEMATOLOGIC: denies gross bleeding, bruising  LYMPHATICS: denies enlarged lymph nodes, extremity swelling  PSYCHIATRIC: denies recent changes in anxiety, depression  ENDOCRINOLOGIC: denies sweating, cold or heat intolerance

## 2023-05-24 NOTE — H&P ADULT - PROBLEM SELECTOR PLAN 3
WBC 20.90 w/left shift; likely reactive  - Afebrile, no clinical symptoms or signs of infection  - F/u AM WBC

## 2023-05-24 NOTE — OCCUPATIONAL THERAPY INITIAL EVALUATION ADULT - ADL RETRAINING, OT EVAL
Patient will dress lower body with moderate assistance, AE as needed within 2-5 sessions. Pt will complete UB dressing independently post setup, seated, within 2-5 sessions.

## 2023-05-24 NOTE — H&P ADULT - ASSESSMENT
67yo F w/pmhx HTN, s/p bovine aortic valve replacement, presents with mechanical fall. Admitted for pubic rami fracture, PTevaluation. 69yo F w/pmhx anemia, HTN, s/p bovine aortic valve replacement, presents to the ED s/p fall. Admitted for pubic rami fx.

## 2023-05-24 NOTE — H&P ADULT - NSICDXFAMILYHX_GEN_ALL_CORE_FT
Swetha Acevedo MD FAMILY HISTORY:  Father  Still living? Unknown  FHx: heart disease, Age at diagnosis: Age Unknown

## 2023-05-25 LAB
ALBUMIN SERPL ELPH-MCNC: 3 G/DL — LOW (ref 3.3–5)
ALP SERPL-CCNC: 80 U/L — SIGNIFICANT CHANGE UP (ref 40–120)
ALT FLD-CCNC: 16 U/L — SIGNIFICANT CHANGE UP (ref 12–78)
ANION GAP SERPL CALC-SCNC: 5 MMOL/L — SIGNIFICANT CHANGE UP (ref 5–17)
APPEARANCE UR: CLEAR — SIGNIFICANT CHANGE UP
AST SERPL-CCNC: 24 U/L — SIGNIFICANT CHANGE UP (ref 15–37)
BACTERIA # UR AUTO: ABNORMAL
BASOPHILS # BLD AUTO: 0.12 K/UL — SIGNIFICANT CHANGE UP (ref 0–0.2)
BASOPHILS NFR BLD AUTO: 0.8 % — SIGNIFICANT CHANGE UP (ref 0–2)
BILIRUB SERPL-MCNC: 1.2 MG/DL — SIGNIFICANT CHANGE UP (ref 0.2–1.2)
BILIRUB UR-MCNC: NEGATIVE — SIGNIFICANT CHANGE UP
BUN SERPL-MCNC: 15 MG/DL — SIGNIFICANT CHANGE UP (ref 7–23)
CALCIUM SERPL-MCNC: 8.6 MG/DL — SIGNIFICANT CHANGE UP (ref 8.5–10.1)
CHLORIDE SERPL-SCNC: 109 MMOL/L — HIGH (ref 96–108)
CO2 SERPL-SCNC: 25 MMOL/L — SIGNIFICANT CHANGE UP (ref 22–31)
COLOR SPEC: SIGNIFICANT CHANGE UP
CREAT SERPL-MCNC: 0.62 MG/DL — SIGNIFICANT CHANGE UP (ref 0.5–1.3)
DIFF PNL FLD: NEGATIVE — SIGNIFICANT CHANGE UP
EGFR: 98 ML/MIN/1.73M2 — SIGNIFICANT CHANGE UP
EOSINOPHIL # BLD AUTO: 0.2 K/UL — SIGNIFICANT CHANGE UP (ref 0–0.5)
EOSINOPHIL NFR BLD AUTO: 1.3 % — SIGNIFICANT CHANGE UP (ref 0–6)
EPI CELLS # UR: SIGNIFICANT CHANGE UP
GLUCOSE SERPL-MCNC: 98 MG/DL — SIGNIFICANT CHANGE UP (ref 70–99)
GLUCOSE UR QL: NEGATIVE — SIGNIFICANT CHANGE UP
HCT VFR BLD CALC: 24.6 % — LOW (ref 34.5–45)
HCV AB S/CO SERPL IA: 0.14 S/CO — SIGNIFICANT CHANGE UP (ref 0–0.99)
HCV AB SERPL-IMP: SIGNIFICANT CHANGE UP
HGB BLD-MCNC: 7.5 G/DL — LOW (ref 11.5–15.5)
IMM GRANULOCYTES NFR BLD AUTO: 0.5 % — SIGNIFICANT CHANGE UP (ref 0–0.9)
KETONES UR-MCNC: NEGATIVE — SIGNIFICANT CHANGE UP
LEUKOCYTE ESTERASE UR-ACNC: NEGATIVE — SIGNIFICANT CHANGE UP
LYMPHOCYTES # BLD AUTO: 18.3 % — SIGNIFICANT CHANGE UP (ref 13–44)
LYMPHOCYTES # BLD AUTO: 2.76 K/UL — SIGNIFICANT CHANGE UP (ref 1–3.3)
MAGNESIUM SERPL-MCNC: 1.9 MG/DL — SIGNIFICANT CHANGE UP (ref 1.6–2.6)
MCHC RBC-ENTMCNC: 28 PG — SIGNIFICANT CHANGE UP (ref 27–34)
MCHC RBC-ENTMCNC: 30.5 GM/DL — LOW (ref 32–36)
MCV RBC AUTO: 91.8 FL — SIGNIFICANT CHANGE UP (ref 80–100)
MONOCYTES # BLD AUTO: 2.14 K/UL — HIGH (ref 0–0.9)
MONOCYTES NFR BLD AUTO: 14.2 % — HIGH (ref 2–14)
NEUTROPHILS # BLD AUTO: 9.76 K/UL — HIGH (ref 1.8–7.4)
NEUTROPHILS NFR BLD AUTO: 64.9 % — SIGNIFICANT CHANGE UP (ref 43–77)
NITRITE UR-MCNC: NEGATIVE — SIGNIFICANT CHANGE UP
NRBC # BLD: 0 /100 WBCS — SIGNIFICANT CHANGE UP (ref 0–0)
PH UR: 7 — SIGNIFICANT CHANGE UP (ref 5–8)
PLATELET # BLD AUTO: 128 K/UL — LOW (ref 150–400)
POTASSIUM SERPL-MCNC: 3.7 MMOL/L — SIGNIFICANT CHANGE UP (ref 3.5–5.3)
POTASSIUM SERPL-SCNC: 3.7 MMOL/L — SIGNIFICANT CHANGE UP (ref 3.5–5.3)
PROCALCITONIN SERPL-MCNC: 0.03 NG/ML — SIGNIFICANT CHANGE UP
PROT SERPL-MCNC: 7.2 G/DL — SIGNIFICANT CHANGE UP (ref 6–8.3)
PROT UR-MCNC: NEGATIVE — SIGNIFICANT CHANGE UP
RBC # BLD: 2.68 M/UL — LOW (ref 3.8–5.2)
RBC # FLD: 17.8 % — HIGH (ref 10.3–14.5)
RBC CASTS # UR COMP ASSIST: SIGNIFICANT CHANGE UP /HPF (ref 0–4)
SODIUM SERPL-SCNC: 139 MMOL/L — SIGNIFICANT CHANGE UP (ref 135–145)
SP GR SPEC: 1 — LOW (ref 1.01–1.02)
UROBILINOGEN FLD QL: NEGATIVE — SIGNIFICANT CHANGE UP
WBC # BLD: 15.06 K/UL — HIGH (ref 3.8–10.5)
WBC # FLD AUTO: 15.06 K/UL — HIGH (ref 3.8–10.5)
WBC UR QL: SIGNIFICANT CHANGE UP

## 2023-05-25 PROCEDURE — 99233 SBSQ HOSP IP/OBS HIGH 50: CPT

## 2023-05-25 PROCEDURE — 93970 EXTREMITY STUDY: CPT | Mod: 26

## 2023-05-25 RX ORDER — SACCHAROMYCES BOULARDII 250 MG
250 POWDER IN PACKET (EA) ORAL
Refills: 0 | Status: DISCONTINUED | OUTPATIENT
Start: 2023-05-25 | End: 2023-06-02

## 2023-05-25 RX ORDER — CEFAZOLIN SODIUM 1 G
VIAL (EA) INJECTION
Refills: 0 | Status: DISCONTINUED | OUTPATIENT
Start: 2023-05-25 | End: 2023-06-01

## 2023-05-25 RX ORDER — CEFAZOLIN SODIUM 1 G
2000 VIAL (EA) INJECTION ONCE
Refills: 0 | Status: COMPLETED | OUTPATIENT
Start: 2023-05-25 | End: 2023-05-25

## 2023-05-25 RX ORDER — CEFAZOLIN SODIUM 1 G
2000 VIAL (EA) INJECTION EVERY 8 HOURS
Refills: 0 | Status: DISCONTINUED | OUTPATIENT
Start: 2023-05-25 | End: 2023-06-01

## 2023-05-25 RX ADMIN — Medication 100 MILLIGRAM(S): at 14:54

## 2023-05-25 RX ADMIN — Medication 250 MILLIGRAM(S): at 17:18

## 2023-05-25 RX ADMIN — LISINOPRIL 10 MILLIGRAM(S): 2.5 TABLET ORAL at 05:01

## 2023-05-25 RX ADMIN — Medication 100 MILLIGRAM(S): at 21:21

## 2023-05-25 RX ADMIN — Medication 3 MILLIGRAM(S): at 21:22

## 2023-05-25 RX ADMIN — Medication 1 APPLICATION(S): at 05:04

## 2023-05-25 RX ADMIN — Medication 325 MILLIGRAM(S): at 11:22

## 2023-05-25 RX ADMIN — Medication 25 MILLIGRAM(S): at 05:02

## 2023-05-25 RX ADMIN — SODIUM CHLORIDE 75 MILLILITER(S): 9 INJECTION INTRAMUSCULAR; INTRAVENOUS; SUBCUTANEOUS at 04:59

## 2023-05-25 RX ADMIN — ENOXAPARIN SODIUM 40 MILLIGRAM(S): 100 INJECTION SUBCUTANEOUS at 05:01

## 2023-05-25 NOTE — PROGRESS NOTE ADULT - PROBLEM SELECTOR PLAN 2
- WBC 20.90 on admission was suspected to be reactive from stress response from fall and fractures  - WBC downtrended to ~16, but had fever overnight and signs of early cellulitis at right elbow where pt had a skin laceration that has been healing.   - ID (TREV Juarez), recs appreciated   - will treat with Ancef for suspected cellulitis and f/up cultures sent overnight when pt had the fever  - also checked Doppler LEs to r/out acute DVT to have caused the low grade fever -- Doppler negative for DVT

## 2023-05-25 NOTE — CONSULT NOTE ADULT - SUBJECTIVE AND OBJECTIVE BOX
Joe, Division of Infectious Diseases  KELTON Brewer, CAITLYN Kay G. [facility]  [phone]    KHADAR MCDONALD  68y, Female  [id number]    HPI--  HPI:  67yo F w/pmhx anemia, HTN, s/p bovine aortic valve replacement, presents to the ED s/p fall. Pt states that around 3:10 she went out to her backyard to plant onions when she tripped over the hose and fell onto the bricks in her backyard. Pt states that she fell on her right side and is unaware if she hit her head. She was unable to get up after the fall and remained on the ground for 2.5 hours. States that she called out for help; however, her  is hard of hearing and has hx of stroke so she is the primary care giver in the house. After a couple of hours, the  went to look for her and found her on the ground in the backyard. He tried to help her up with his cane but was unable. He then called his neighbors for help who then eventually called an ambulance and brought her to the hospital.  ED Course:  Vitals: T 97.8, HR 80, /85, RR 18, spo2 98% on RA  Labs significant for: WBC 20.90 w/left shift, H/H 8.7/27.9,   UA: trace LEC, 3-5 RBC, occasional bacteria  ECG: NSR @ 92 BPM, possible L atrial enlargement, nonspecific ST abnormality, pending official read  CXR: Cardiomegaly, no acute lung pathology on personal read  XR R Hip/Femur/Shoulder: f/u read   CT Head/Cervical Spine: No intracranial bleed, mass effect or depressed skull fracture. No acute fracture or acute subluxation  CT Pelvis: f/u read   Received Morphine 4mg IVP x1, 1L NS bolus in ED (24 May 2023 00:38)    ID c/s for fever overnight to 100.7F  Pt reporting hx as above.   Stating that since yesterday and overnight she has been experiencing a more than usual intense pain by the R elbow where she has sustained a laceration  Reports that her arm hit the bricks, no grass/dirt in the area and pt did not recall any bugs near her      Active Medications--  acetaminophen     Tablet .. 650 milliGRAM(s) Oral every 6 hours PRN  aluminum hydroxide/magnesium hydroxide/simethicone Suspension 30 milliLiter(s) Oral every 4 hours PRN  enoxaparin Injectable 40 milliGRAM(s) SubCutaneous every 24 hours  ferrous    sulfate 325 milliGRAM(s) Oral daily  lisinopril 10 milliGRAM(s) Oral daily  melatonin 3 milliGRAM(s) Oral at bedtime PRN  metoprolol succinate ER 25 milliGRAM(s) Oral daily  morphine  - Injectable 2 milliGRAM(s) IV Push every 6 hours PRN  morphine  - Injectable 1 milliGRAM(s) IV Push every 6 hours PRN  ondansetron Injectable 4 milliGRAM(s) IV Push every 8 hours PRN    Antimicrobials:     Immunologic:     ROS:  CONSTITUTIONAL: No fevers or chills. No weakness or headache. No weight changes.  EYES/ENT: No visual or hearing changes. No sore throat or throat pain .  NECK: No pain or stiffness  RESPIRATORY: No cough, wheezing, or hemoptysis. No shortness of breath  CARDIOVASCULAR: No chest pain or palpitations  GASTROINTESTINAL: No abdominal pain. No nausea or vomiting. No diarrhea or constipation.  GENITOURINARY: No dysuria, frequency or hematuria  NEUROLOGICAL: No numbness or weakness  SKIN: No itching or rashes  PSYCHIATRIC: Pleasant. Appropriate affect    Allergies: No Known Allergies    PMH -- Hypertension    Anemia      PSH -- S/P aortic valve replacement      FH -- FHx: heart disease (Father)      Social History --  EtOH: denies   Tobacco: denies   Drug Use: denies     Travel/Environmental/Occupational History:    Physical Exam--  Vital Signs Last 24 Hrs  T(F): 99 (25 May 2023 11:10), Max: 100.7 (24 May 2023 20:52)  HR: 83 (25 May 2023 11:10) (73 - 90)  BP: 131/81 (25 May 2023 11:10) (113/59 - 131/81)  RR: 18 (25 May 2023 11:10) (18 - 18)  SpO2: 96% (25 May 2023 11:10) (95% - 97%)  General: nontoxic-appearing, no acute distress  HEENT: NC/AT, EOMI,  Lungs: Clear bilaterally without rales, wheezing or rhonchi  Heart: Regular rate and rhythm. No murmur, rub or gallop.  Abdomen: Soft. Nondistended. Nontender.   Extremities: No cyanosis or clubbing. No edema. R elbow laceration w/ surrounding erythema, per pt increased and more red than before  Skin: Warm. Dry. Good turgor.     Laboratory & Imaging Data:  CBC:                       7.5    15.06 )-----------( 128      ( 25 May 2023 06:15 )             24.6     CMP:     139  |  109<H>  |  15  ----------------------------<  98  3.7   |  25  |  0.62    Ca    8.6      25 May 2023 06:15  Mg     1.9         TPro  7.2  /  Alb  3.0<L>  /  TBili  1.2  /  DBili  x   /  AST  24  /  ALT  16  /  AlkPhos  80      LIVER FUNCTIONS - ( 25 May 2023 06:15 )  Alb: 3.0 g/dL / Pro: 7.2 g/dL / ALK PHOS: 80 U/L / ALT: 16 U/L / AST: 24 U/L / GGT: x           Urinalysis Basic - ( 23 May 2023 22:58 )    Color: Yellow / Appearance: Clear / S.010 / pH: x  Gluc: x / Ketone: Negative  / Bili: Negative / Urobili: Negative   Blood: x / Protein: Negative / Nitrite: Negative   Leuk Esterase: Trace / RBC: 3-5 /HPF / WBC 3-5   Sq Epi: x / Non Sq Epi: x / Bacteria: Occasional        Microbiology: reviewed        Radiology: reviewed

## 2023-05-25 NOTE — PROGRESS NOTE ADULT - SUBJECTIVE AND OBJECTIVE BOX
Patient is a 68y old  Female who presents with a chief complaint of Fall (25 May 2023 12:32)      INTERVAL HPI/OVERNIGHT EVENTS: Overnight, pt had fever to 100.7. Cultures sent and CXR performed. Pt states she still has pain in the right pelvic area especially when standing/walking. Pt admits feeling the fever last night. Pt denies dysuria, flank pain, cough, SOB, abd pain, diarrhea. Pt does admit having more tenderness in right elbow area where she had a scab from her fall.    MEDICATIONS  (STANDING):  ceFAZolin   IVPB 2000 milliGRAM(s) IV Intermittent every 8 hours  ceFAZolin   IVPB      enoxaparin Injectable 40 milliGRAM(s) SubCutaneous every 24 hours  ferrous    sulfate 325 milliGRAM(s) Oral daily  lisinopril 10 milliGRAM(s) Oral daily  metoprolol succinate ER 25 milliGRAM(s) Oral daily  saccharomyces boulardii 250 milliGRAM(s) Oral two times a day    MEDICATIONS  (PRN):  acetaminophen     Tablet .. 650 milliGRAM(s) Oral every 6 hours PRN Temp greater or equal to 38C (100.4F), Mild Pain (1 - 3)  aluminum hydroxide/magnesium hydroxide/simethicone Suspension 30 milliLiter(s) Oral every 4 hours PRN Dyspepsia  melatonin 3 milliGRAM(s) Oral at bedtime PRN Insomnia  morphine  - Injectable 2 milliGRAM(s) IV Push every 6 hours PRN Severe Pain (7 - 10)  ondansetron Injectable 4 milliGRAM(s) IV Push every 8 hours PRN Nausea and/or Vomiting  oxycodone    5 mG/acetaminophen 325 mG 1 Tablet(s) Oral every 6 hours PRN Moderate Pain (4 - 6)      Allergies    No Known Allergies    Intolerances        REVIEW OF SYSTEMS:  CONSTITUTIONAL: No fever or chills  HEENT:  No headache, no sore throat  RESPIRATORY: No cough, wheezing, or shortness of breath  CARDIOVASCULAR: No chest pain, palpitations  GASTROINTESTINAL: No abd pain, nausea, vomiting, or diarrhea  GENITOURINARY: No dysuria, frequency, or hematuria  NEUROLOGICAL: no focal weakness or dizziness  MUSCULOSKELETAL: +pain in right pelvic area; + tenderness in right elbow near scab; no myalgias     Vital Signs Last 24 Hrs  T(F): 99 (25 May 2023 11:10), Max: 100.7 (24 May 2023 20:52)  HR: 83 (25 May 2023 11:10) (73 - 90)  BP: 131/81 (25 May 2023 11:10) (113/59 - 131/81)  RR: 18 (25 May 2023 11:10) (18 - 18)  SpO2: 96% (25 May 2023 11:10) (95% - 97%)        PHYSICAL EXAM:  GENERAL: NAD  HEENT:  anicteric, moist mucous membranes  CHEST/LUNG:  CTA b/l, no rales, wheezes, or rhonchi  HEART:  RRR, S1, S2, +systolic murmur  ABDOMEN:  BS+, soft, nontender, nondistended  EXTREMITIES: + tenderness on skin with mild erythema around right elbow healing scab. No drainage. no cyanosis, or calf tenderness  NERVOUS SYSTEM: answers questions and follows commands appropriately    LABS:                          7.5    15.06 )-----------( 128      ( 25 May 2023 06:15 )             24.6     CBC Full  -  ( 25 May 2023 06:15 )  WBC Count : 15.06 K/uL  Hemoglobin : 7.5 g/dL  Hematocrit : 24.6 %  Platelet Count - Automated : 128 K/uL  Mean Cell Volume : 91.8 fl  Mean Cell Hemoglobin : 28.0 pg  Mean Cell Hemoglobin Concentration : 30.5 gm/dL  Auto Neutrophil # : 9.76 K/uL  Auto Lymphocyte # : 2.76 K/uL  Auto Monocyte # : 2.14 K/uL  Auto Eosinophil # : 0.20 K/uL  Auto Basophil # : 0.12 K/uL  Auto Neutrophil % : 64.9 %  Auto Lymphocyte % : 18.3 %  Auto Monocyte % : 14.2 %  Auto Eosinophil % : 1.3 %  Auto Basophil % : 0.8 %        139  |  109<H>  |  15  ----------------------------<  98  3.7   |  25  |  0.62    Ca    8.6      25 May 2023 06:15  Mg     1.9         TPro  7.2  /  Alb  3.0<L>  /  TBili  1.2  /  DBili  x   /  AST  24  /  ALT  16  /  AlkPhos  80        Urinalysis Basic - ( 25 May 2023 13:00 )    Color: Pale Yellow / Appearance: Clear / S.005 / pH: x  Gluc: x / Ketone: Negative  / Bili: Negative / Urobili: Negative   Blood: x / Protein: Negative / Nitrite: Negative   Leuk Esterase: Negative / RBC: 0-2 /HPF / WBC 0-2   Sq Epi: x / Non Sq Epi: x / Bacteria: Few      CAPILLARY BLOOD GLUCOSE        RADIOLOGY & ADDITIONAL TESTS:    Personally reviewed.     Consultant(s) Notes Reviewed:  [x] YES  [ ] NO     Patient is a 68y old  Female who presents with a chief complaint of mechanical fall, right hip pain, inability to ambulate due to pain.       INTERVAL HPI/OVERNIGHT EVENTS: Overnight, pt had fever to 100.7. Cultures sent and CXR performed. Pt states she still has pain in the right pelvic area especially when standing/walking. Pt admits feeling the fever last night. Pt denies dysuria, flank pain, cough, SOB, abd pain, diarrhea. Pt does admit having more tenderness in right elbow area where she had a scab from her fall.    MEDICATIONS  (STANDING):  ceFAZolin   IVPB 2000 milliGRAM(s) IV Intermittent every 8 hours  ceFAZolin   IVPB      enoxaparin Injectable 40 milliGRAM(s) SubCutaneous every 24 hours  ferrous    sulfate 325 milliGRAM(s) Oral daily  lisinopril 10 milliGRAM(s) Oral daily  metoprolol succinate ER 25 milliGRAM(s) Oral daily  saccharomyces boulardii 250 milliGRAM(s) Oral two times a day    MEDICATIONS  (PRN):  acetaminophen     Tablet .. 650 milliGRAM(s) Oral every 6 hours PRN Temp greater or equal to 38C (100.4F), Mild Pain (1 - 3)  aluminum hydroxide/magnesium hydroxide/simethicone Suspension 30 milliLiter(s) Oral every 4 hours PRN Dyspepsia  melatonin 3 milliGRAM(s) Oral at bedtime PRN Insomnia  morphine  - Injectable 2 milliGRAM(s) IV Push every 6 hours PRN Severe Pain (7 - 10)  ondansetron Injectable 4 milliGRAM(s) IV Push every 8 hours PRN Nausea and/or Vomiting  oxycodone    5 mG/acetaminophen 325 mG 1 Tablet(s) Oral every 6 hours PRN Moderate Pain (4 - 6)      Allergies    No Known Allergies    Intolerances        REVIEW OF SYSTEMS:  CONSTITUTIONAL: No fever or chills  HEENT:  No headache, no sore throat  RESPIRATORY: No cough, wheezing, or shortness of breath  CARDIOVASCULAR: No chest pain, palpitations  GASTROINTESTINAL: No abd pain, nausea, vomiting, or diarrhea  GENITOURINARY: No dysuria, frequency, or hematuria  NEUROLOGICAL: no focal weakness or dizziness  MUSCULOSKELETAL: +pain in right pelvic area; + tenderness in right elbow near scab; no myalgias     Vital Signs Last 24 Hrs  T(F): 99 (25 May 2023 11:10), Max: 100.7 (24 May 2023 20:52)  HR: 83 (25 May 2023 11:10) (73 - 90)  BP: 131/81 (25 May 2023 11:10) (113/59 - 131/81)  RR: 18 (25 May 2023 11:10) (18 - 18)  SpO2: 96% (25 May 2023 11:10) (95% - 97%)        PHYSICAL EXAM:  GENERAL: NAD  HEENT:  anicteric, moist mucous membranes  CHEST/LUNG:  CTA b/l, no rales, wheezes, or rhonchi  HEART:  RRR, S1, S2, +systolic murmur  ABDOMEN:  BS+, soft, nontender, nondistended  EXTREMITIES: + tenderness on skin with mild erythema around right elbow healing scab. No drainage. no cyanosis, or calf tenderness  NERVOUS SYSTEM: answers questions and follows commands appropriately    LABS:                          7.5    15.06 )-----------( 128      ( 25 May 2023 06:15 )             24.6     CBC Full  -  ( 25 May 2023 06:15 )  WBC Count : 15.06 K/uL  Hemoglobin : 7.5 g/dL  Hematocrit : 24.6 %  Platelet Count - Automated : 128 K/uL  Mean Cell Volume : 91.8 fl  Mean Cell Hemoglobin : 28.0 pg  Mean Cell Hemoglobin Concentration : 30.5 gm/dL  Auto Neutrophil # : 9.76 K/uL  Auto Lymphocyte # : 2.76 K/uL  Auto Monocyte # : 2.14 K/uL  Auto Eosinophil # : 0.20 K/uL  Auto Basophil # : 0.12 K/uL  Auto Neutrophil % : 64.9 %  Auto Lymphocyte % : 18.3 %  Auto Monocyte % : 14.2 %  Auto Eosinophil % : 1.3 %  Auto Basophil % : 0.8 %        139  |  109<H>  |  15  ----------------------------<  98  3.7   |  25  |  0.62    Ca    8.6      25 May 2023 06:15  Mg     1.9         TPro  7.2  /  Alb  3.0<L>  /  TBili  1.2  /  DBili  x   /  AST  24  /  ALT  16  /  AlkPhos  80        Urinalysis Basic - ( 25 May 2023 13:00 )    Color: Pale Yellow / Appearance: Clear / S.005 / pH: x  Gluc: x / Ketone: Negative  / Bili: Negative / Urobili: Negative   Blood: x / Protein: Negative / Nitrite: Negative   Leuk Esterase: Negative / RBC: 0-2 /HPF / WBC 0-2   Sq Epi: x / Non Sq Epi: x / Bacteria: Few      CAPILLARY BLOOD GLUCOSE        RADIOLOGY & ADDITIONAL TESTS:    Personally reviewed.     Consultant(s) Notes Reviewed:  [x] YES  [ ] NO

## 2023-05-25 NOTE — CONSULT NOTE ADULT - ASSESSMENT
69yo F w/pmhx anemia, HTN, s/p bovine aortic valve replacement, presents to the ED s/p fall. Admitted for pubic rami fx.     ID c/s for fever and persistent leukocytosis  R arm exam consistent w/ possible early cellulitis  - febrile overnight 5/25 to 100.7F  - admission leukocytosis to 20, downtrending 16 --> 15   -- admission leukocytosis suspected to be leukemoid rxn  - pt w/ sustained laceration to R elbow area, now w/ surrounding erythema and persistent swelling/pain  - imaging reviewed -- pt w/ sustained pubic rami fx    Recommendations:  Suspect possible early cellulitis in area of R elbow  Can start patient on cefazolin 2gm q8h  F/u pending BCx  UA negative, pt w/o urinary sx, no need for urine culture at this time  Appreciate ortho recs given pubic rami fx--no acute intervention  Pain control, supportive care and additional management per primary team    Will monitor for clinical improvement.  Likely plan x5-7 day course w/ switch to PO on d/c     D/w Dr. Dacosta    Infectious Diseases will continue to follow. Please call with any questions.   Daniela Juarez M.D.  [facility] Division of Infectious Diseases [phone]

## 2023-05-26 DIAGNOSIS — L03.90 CELLULITIS, UNSPECIFIED: ICD-10-CM

## 2023-05-26 LAB
ANION GAP SERPL CALC-SCNC: 5 MMOL/L — SIGNIFICANT CHANGE UP (ref 5–17)
BUN SERPL-MCNC: 16 MG/DL — SIGNIFICANT CHANGE UP (ref 7–23)
CALCIUM SERPL-MCNC: 8.7 MG/DL — SIGNIFICANT CHANGE UP (ref 8.5–10.1)
CHLORIDE SERPL-SCNC: 106 MMOL/L — SIGNIFICANT CHANGE UP (ref 96–108)
CO2 SERPL-SCNC: 25 MMOL/L — SIGNIFICANT CHANGE UP (ref 22–31)
CREAT SERPL-MCNC: 0.85 MG/DL — SIGNIFICANT CHANGE UP (ref 0.5–1.3)
EGFR: 75 ML/MIN/1.73M2 — SIGNIFICANT CHANGE UP
GLUCOSE SERPL-MCNC: 153 MG/DL — HIGH (ref 70–99)
HCT VFR BLD CALC: 25.6 % — LOW (ref 34.5–45)
HGB BLD-MCNC: 7.8 G/DL — LOW (ref 11.5–15.5)
MAGNESIUM SERPL-MCNC: 1.7 MG/DL — SIGNIFICANT CHANGE UP (ref 1.6–2.6)
MCHC RBC-ENTMCNC: 27.8 PG — SIGNIFICANT CHANGE UP (ref 27–34)
MCHC RBC-ENTMCNC: 30.5 GM/DL — LOW (ref 32–36)
MCV RBC AUTO: 91.1 FL — SIGNIFICANT CHANGE UP (ref 80–100)
NRBC # BLD: 0 /100 WBCS — SIGNIFICANT CHANGE UP (ref 0–0)
PHOSPHATE SERPL-MCNC: 2.9 MG/DL — SIGNIFICANT CHANGE UP (ref 2.5–4.5)
PLATELET # BLD AUTO: 142 K/UL — LOW (ref 150–400)
POTASSIUM SERPL-MCNC: 3.7 MMOL/L — SIGNIFICANT CHANGE UP (ref 3.5–5.3)
POTASSIUM SERPL-SCNC: 3.7 MMOL/L — SIGNIFICANT CHANGE UP (ref 3.5–5.3)
RBC # BLD: 2.81 M/UL — LOW (ref 3.8–5.2)
RBC # FLD: 17.8 % — HIGH (ref 10.3–14.5)
SODIUM SERPL-SCNC: 136 MMOL/L — SIGNIFICANT CHANGE UP (ref 135–145)
WBC # BLD: 11.94 K/UL — HIGH (ref 3.8–10.5)
WBC # FLD AUTO: 11.94 K/UL — HIGH (ref 3.8–10.5)

## 2023-05-26 PROCEDURE — 99232 SBSQ HOSP IP/OBS MODERATE 35: CPT

## 2023-05-26 RX ORDER — MAGNESIUM SULFATE 500 MG/ML
1 VIAL (ML) INJECTION ONCE
Refills: 0 | Status: COMPLETED | OUTPATIENT
Start: 2023-05-26 | End: 2023-05-26

## 2023-05-26 RX ORDER — POTASSIUM CHLORIDE 20 MEQ
40 PACKET (EA) ORAL ONCE
Refills: 0 | Status: COMPLETED | OUTPATIENT
Start: 2023-05-26 | End: 2023-05-26

## 2023-05-26 RX ADMIN — Medication 40 MILLIEQUIVALENT(S): at 11:11

## 2023-05-26 RX ADMIN — ENOXAPARIN SODIUM 40 MILLIGRAM(S): 100 INJECTION SUBCUTANEOUS at 05:10

## 2023-05-26 RX ADMIN — Medication 100 MILLIGRAM(S): at 13:50

## 2023-05-26 RX ADMIN — Medication 100 MILLIGRAM(S): at 05:11

## 2023-05-26 RX ADMIN — Medication 250 MILLIGRAM(S): at 17:13

## 2023-05-26 RX ADMIN — Medication 250 MILLIGRAM(S): at 05:10

## 2023-05-26 RX ADMIN — Medication 325 MILLIGRAM(S): at 11:12

## 2023-05-26 RX ADMIN — Medication 25 MILLIGRAM(S): at 05:10

## 2023-05-26 RX ADMIN — Medication 100 MILLIGRAM(S): at 21:32

## 2023-05-26 RX ADMIN — LISINOPRIL 10 MILLIGRAM(S): 2.5 TABLET ORAL at 05:10

## 2023-05-26 RX ADMIN — Medication 100 GRAM(S): at 14:20

## 2023-05-26 NOTE — PROGRESS NOTE ADULT - SUBJECTIVE AND OBJECTIVE BOX
[facility], Division of Infectious Diseases  KELTON Brewer Y. Patel, S. Shah, G. [facility]  [phone]    Name: KHADAR MCDONALD  Age: 68y  Gender: Female  MRN: [id number]    Interval History:  Patient seen and examined at bedside this morning  No acute overnight events. Afebrile  Feeling better  R arm w/ reduced swelling and erythema slightly more faint today  Notes reviewed    Antibiotics:  ceFAZolin   IVPB 2000 milliGRAM(s) IV Intermittent every 8 hours  ceFAZolin   IVPB          Medications:  acetaminophen     Tablet .. 650 milliGRAM(s) Oral every 6 hours PRN  aluminum hydroxide/magnesium hydroxide/simethicone Suspension 30 milliLiter(s) Oral every 4 hours PRN  ceFAZolin   IVPB 2000 milliGRAM(s) IV Intermittent every 8 hours  ceFAZolin   IVPB      enoxaparin Injectable 40 milliGRAM(s) SubCutaneous every 24 hours  ferrous    sulfate 325 milliGRAM(s) Oral daily  lisinopril 10 milliGRAM(s) Oral daily  magnesium sulfate  IVPB 1 Gram(s) IV Intermittent once  melatonin 3 milliGRAM(s) Oral at bedtime PRN  metoprolol succinate ER 25 milliGRAM(s) Oral daily  morphine  - Injectable 2 milliGRAM(s) IV Push every 6 hours PRN  ondansetron Injectable 4 milliGRAM(s) IV Push every 8 hours PRN  oxycodone    5 mG/acetaminophen 325 mG 1 Tablet(s) Oral every 6 hours PRN  saccharomyces boulardii 250 milliGRAM(s) Oral two times a day      Review of Systems:  Review of systems otherwise negative except as previously noted.    Allergies: No Known Allergies    For details regarding the patient's past medical history, social history, family history, and other miscellaneous elements, please refer the initial infectious diseases consultation and/or the admitting history and physical examination for this admission.    Objective:  Vitals:   T(C): 36.7 (23 @ 11:41), Max: 37.7 (23 @ 21:32)  HR: 71 (23 @ 11:41) (69 - 84)  BP: 101/61 (23 @ 11:41) (101/61 - 135/70)  RR: 19 (23 @ 11:41) (18 - 19)  SpO2: 96% (23 @ 11:41) (93% - 96%)    Physical Examination:  General: nontoxic-appearing, no acute distress  HEENT: NC/AT, EOMI,  Lungs: Clear bilaterally without rales, wheezing or rhonchi  Heart: Regular rate and rhythm. No murmur, rub or gallop.  Abdomen: Soft. Nondistended. Nontender.   Extremities: No cyanosis or clubbing. No edema. R elbow laceration w/ surrounding erythema, erythema improved slightly  Skin: Warm. Dry. Good turgor.       Laboratory Studies:  CBC:                       7.8    11.94 )-----------( 142      ( 26 May 2023 08:51 )             25.6     CMP:     136  |  106  |  16  ----------------------------<  153<H>  3.7   |  25  |  0.85    Ca    8.7      26 May 2023 08:51  Phos  2.9       Mg     1.7         TPro  7.2  /  Alb  3.0<L>  /  TBili  1.2  /  DBili  x   /  AST  24  /  ALT  16  /  AlkPhos  80      LIVER FUNCTIONS - ( 25 May 2023 06:15 )  Alb: 3.0 g/dL / Pro: 7.2 g/dL / ALK PHOS: 80 U/L / ALT: 16 U/L / AST: 24 U/L / GGT: x           Urinalysis Basic - ( 25 May 2023 13:00 )    Color: Pale Yellow / Appearance: Clear / S.005 / pH: x  Gluc: x / Ketone: Negative  / Bili: Negative / Urobili: Negative   Blood: x / Protein: Negative / Nitrite: Negative   Leuk Esterase: Negative / RBC: 0-2 /HPF / WBC 0-2   Sq Epi: x / Non Sq Epi: x / Bacteria: Few        Microbiology: reviewed    Culture - Blood (collected 23 @ 21:45)  Source: .Blood Blood-Peripheral  Preliminary Report (23 @ 01:03):    No growth to date.    Culture - Blood (collected 23 @ 21:40)  Source: .Blood Blood-Peripheral  Preliminary Report (23 @ 01:03):    No growth to date.          Radiology: reviewed

## 2023-05-26 NOTE — PROGRESS NOTE ADULT - PROBLEM SELECTOR PLAN 2
- WBC 20.90 on admission was suspected to be reactive from stress response from fall and fractures  - WBC downtrended to ~16, but had fever overnight and signs of early cellulitis at right elbow where pt had a skin laceration that has been healing.   - ID (TREV Juarez), recs appreciated   - will treat with Ancef for suspected cellulitis and f/up cultures sent overnight when pt had the fever  - also checked Doppler LEs to r/out acute DVT to have caused the low grade fever -- Doppler negative for DVT  - leukocytosis continuing to improve - down to 11.9 today  - will likely transition to Keflex tomorrow if pt continuing to improve - WBC 20.90 on admission was suspected to be reactive from stress response from fall and fractures  - WBC downtrended to ~16, but had fever overnight and signs of early cellulitis at right elbow where pt had a skin laceration that has been healing.   - ID (TREV Juarez), recs appreciated   - will treat with Ancef for suspected cellulitis and f/up cultures sent overnight when pt had the fever  - also checked Doppler LEs to r/out acute DVT to have caused the low grade fever -- Doppler negative for DVT  - leukocytosis continuing to improve - down to 11.9 today  - BCx NGTD, UA negative, CXR clear   - will likely transition to Keflex tomorrow if pt continuing to improve

## 2023-05-26 NOTE — PROGRESS NOTE ADULT - TIME BILLING
Note written by attending, see above.  Time spent: 40min. More than 50% of the visit was spent counseling the patient and pt's daughter on medical condition - right-sided superior and inferior pubic rami fractures, right sacral ala fracture, now with suspected right UE cellulitis, Doppler US of LEs reason for test and negative result.

## 2023-05-26 NOTE — PROGRESS NOTE ADULT - SUBJECTIVE AND OBJECTIVE BOX
Patient is a 68y old  Female who presents with a chief complaint of mechanical fall, right hip pain, inability to ambulate due to pain.       INTERVAL HPI/OVERNIGHT EVENTS: Pt states fever resolved. Did have some sweats last night. No chills. Pt states she still has pain in the right pelvic area especially when standing/walking. Right elbow area less tender. Pt denies dysuria, flank pain, cough, SOB, abd pain, diarrhea.    MEDICATIONS  (STANDING):  ceFAZolin   IVPB 2000 milliGRAM(s) IV Intermittent every 8 hours  ceFAZolin   IVPB      enoxaparin Injectable 40 milliGRAM(s) SubCutaneous every 24 hours  ferrous    sulfate 325 milliGRAM(s) Oral daily  lisinopril 10 milliGRAM(s) Oral daily  metoprolol succinate ER 25 milliGRAM(s) Oral daily  saccharomyces boulardii 250 milliGRAM(s) Oral two times a day    MEDICATIONS  (PRN):  acetaminophen     Tablet .. 650 milliGRAM(s) Oral every 6 hours PRN Temp greater or equal to 38C (100.4F), Mild Pain (1 - 3)  aluminum hydroxide/magnesium hydroxide/simethicone Suspension 30 milliLiter(s) Oral every 4 hours PRN Dyspepsia  melatonin 3 milliGRAM(s) Oral at bedtime PRN Insomnia  morphine  - Injectable 2 milliGRAM(s) IV Push every 6 hours PRN Severe Pain (7 - 10)  ondansetron Injectable 4 milliGRAM(s) IV Push every 8 hours PRN Nausea and/or Vomiting  oxycodone    5 mG/acetaminophen 325 mG 1 Tablet(s) Oral every 6 hours PRN Moderate Pain (4 - 6)      Allergies    No Known Allergies    Intolerances        REVIEW OF SYSTEMS:  CONSTITUTIONAL: No fever or chills  HEENT:  No headache, no sore throat  RESPIRATORY: No cough, wheezing, or shortness of breath  CARDIOVASCULAR: No chest pain, palpitations  GASTROINTESTINAL: No abd pain, nausea, vomiting, or diarrhea  GENITOURINARY: No dysuria, frequency, or hematuria  NEUROLOGICAL: no focal weakness or dizziness  MUSCULOSKELETAL: +pain in right pelvic area; + tenderness in right elbow near scab (improving); no myalgias     Vital Signs Last 24 Hrs  T(C): 36.7 (23 @ 11:41), Max: 37.7 (23 @ 21:32)  HR: 71 (23 @ 11:41) (69 - 84)  BP: 101/61 (23 @ 11:41) (101/61 - 135/70)  RR: 19 (23 @ 11:41) (18 - 19)  SpO2: 96% (23 @ 11:41) (93% - 96%)        PHYSICAL EXAM:  GENERAL: NAD  HEENT:  anicteric, moist mucous membranes  CHEST/LUNG:  CTA b/l, no rales, wheezes, or rhonchi  HEART:  RRR, S1, S2, +systolic murmur  ABDOMEN:  BS+, soft, nontender, nondistended  EXTREMITIES: + mild tenderness on skin with mild erythema around right elbow healing scab. No drainage. No cyanosis, or calf tenderness  NERVOUS SYSTEM: answers questions and follows commands appropriately    LABS:                                     7.8    11.94 )-----------( 142      ( 26 May 2023 08:51 )             25.6     CBC Full  -  ( 26 May 2023 08:51 )  WBC Count : 11.94 K/uL  Hemoglobin : 7.8 g/dL  Hematocrit : 25.6 %  Platelet Count - Automated : 142 K/uL  Mean Cell Volume : 91.1 fl  Mean Cell Hemoglobin : 27.8 pg  Mean Cell Hemoglobin Concentration : 30.5 gm/dL  Auto Neutrophil # : x  Auto Lymphocyte # : x  Auto Monocyte # : x  Auto Eosinophil # : x  Auto Basophil # : x  Auto Neutrophil % : x  Auto Lymphocyte % : x  Auto Monocyte % : x  Auto Eosinophil % : x  Auto Basophil % : x        136  |  106  |  16  ----------------------------<  153<H>  3.7   |  25  |  0.85    Ca    8.7      26 May 2023 08:51  Phos  2.9       Mg     1.7             Culture - Blood (collected 24 May 2023 21:45)  Source: .Blood Blood-Peripheral  Preliminary Report (26 May 2023 01:03):    No growth to date.    Culture - Blood (collected 24 May 2023 21:40)  Source: .Blood Blood-Peripheral  Preliminary Report (26 May 2023 01:03):    No growth to date.        Urinalysis Basic - ( 25 May 2023 13:00 )    Color: Pale Yellow / Appearance: Clear / S.005 / pH: x  Gluc: x / Ketone: Negative  / Bili: Negative / Urobili: Negative   Blood: x / Protein: Negative / Nitrite: Negative   Leuk Esterase: Negative / RBC: 0-2 /HPF / WBC 0-2   Sq Epi: x / Non Sq Epi: x / Bacteria: Few      CAPILLARY BLOOD GLUCOSE        RADIOLOGY & ADDITIONAL TESTS:    Personally reviewed.     Consultant(s) Notes Reviewed:  [x] YES  [ ] NO     Patient is a 68y old  Female who presents with a chief complaint of mechanical fall, right hip pain, inability to ambulate due to pain.        INTERVAL HPI/OVERNIGHT EVENTS: Pt states fever resolved. Did have some sweats last night. No chills. Pt states she still has pain in the right pelvic area especially when standing/walking. Right elbow area less tender. Pt denies dysuria, flank pain, cough, SOB, abd pain, diarrhea.    MEDICATIONS  (STANDING):  ceFAZolin   IVPB 2000 milliGRAM(s) IV Intermittent every 8 hours  ceFAZolin   IVPB      enoxaparin Injectable 40 milliGRAM(s) SubCutaneous every 24 hours  ferrous    sulfate 325 milliGRAM(s) Oral daily  lisinopril 10 milliGRAM(s) Oral daily  metoprolol succinate ER 25 milliGRAM(s) Oral daily  saccharomyces boulardii 250 milliGRAM(s) Oral two times a day    MEDICATIONS  (PRN):  acetaminophen     Tablet .. 650 milliGRAM(s) Oral every 6 hours PRN Temp greater or equal to 38C (100.4F), Mild Pain (1 - 3)  aluminum hydroxide/magnesium hydroxide/simethicone Suspension 30 milliLiter(s) Oral every 4 hours PRN Dyspepsia  melatonin 3 milliGRAM(s) Oral at bedtime PRN Insomnia  morphine  - Injectable 2 milliGRAM(s) IV Push every 6 hours PRN Severe Pain (7 - 10)  ondansetron Injectable 4 milliGRAM(s) IV Push every 8 hours PRN Nausea and/or Vomiting  oxycodone    5 mG/acetaminophen 325 mG 1 Tablet(s) Oral every 6 hours PRN Moderate Pain (4 - 6)      Allergies    No Known Allergies    Intolerances        REVIEW OF SYSTEMS:  CONSTITUTIONAL: No fever or chills  HEENT:  No headache, no sore throat  RESPIRATORY: No cough, wheezing, or shortness of breath  CARDIOVASCULAR: No chest pain, palpitations  GASTROINTESTINAL: No abd pain, nausea, vomiting, or diarrhea  GENITOURINARY: No dysuria, frequency, or hematuria  NEUROLOGICAL: no focal weakness or dizziness  MUSCULOSKELETAL: +pain in right pelvic area; + tenderness in right elbow near scab (improving); no myalgias     Vital Signs Last 24 Hrs  T(C): 36.7 (23 @ 11:41), Max: 37.7 (23 @ 21:32)  HR: 71 (23 @ 11:41) (69 - 84)  BP: 101/61 (23 @ 11:41) (101/61 - 135/70)  RR: 19 (23 @ 11:41) (18 - 19)  SpO2: 96% (23 @ 11:41) (93% - 96%)        PHYSICAL EXAM:  GENERAL: NAD  HEENT:  anicteric, moist mucous membranes  CHEST/LUNG:  CTA b/l, no rales, wheezes, or rhonchi  HEART:  RRR, S1, S2, +systolic murmur  ABDOMEN:  BS+, soft, nontender, nondistended  EXTREMITIES: + mild tenderness on skin with mild erythema around right elbow healing scab. No drainage. No cyanosis, or calf tenderness  NERVOUS SYSTEM: answers questions and follows commands appropriately    LABS:                                     7.8    11.94 )-----------( 142      ( 26 May 2023 08:51 )             25.6     CBC Full  -  ( 26 May 2023 08:51 )  WBC Count : 11.94 K/uL  Hemoglobin : 7.8 g/dL  Hematocrit : 25.6 %  Platelet Count - Automated : 142 K/uL  Mean Cell Volume : 91.1 fl  Mean Cell Hemoglobin : 27.8 pg  Mean Cell Hemoglobin Concentration : 30.5 gm/dL  Auto Neutrophil # : x  Auto Lymphocyte # : x  Auto Monocyte # : x  Auto Eosinophil # : x  Auto Basophil # : x  Auto Neutrophil % : x  Auto Lymphocyte % : x  Auto Monocyte % : x  Auto Eosinophil % : x  Auto Basophil % : x        136  |  106  |  16  ----------------------------<  153<H>  3.7   |  25  |  0.85    Ca    8.7      26 May 2023 08:51  Phos  2.9       Mg     1.7             Culture - Blood (collected 24 May 2023 21:45)  Source: .Blood Blood-Peripheral  Preliminary Report (26 May 2023 01:03):    No growth to date.    Culture - Blood (collected 24 May 2023 21:40)  Source: .Blood Blood-Peripheral  Preliminary Report (26 May 2023 01:03):    No growth to date.        Urinalysis Basic - ( 25 May 2023 13:00 )    Color: Pale Yellow / Appearance: Clear / S.005 / pH: x  Gluc: x / Ketone: Negative  / Bili: Negative / Urobili: Negative   Blood: x / Protein: Negative / Nitrite: Negative   Leuk Esterase: Negative / RBC: 0-2 /HPF / WBC 0-2   Sq Epi: x / Non Sq Epi: x / Bacteria: Few      CAPILLARY BLOOD GLUCOSE        RADIOLOGY & ADDITIONAL TESTS:    Personally reviewed.     Consultant(s) Notes Reviewed:  [x] YES  [ ] NO

## 2023-05-27 LAB
ANION GAP SERPL CALC-SCNC: 2 MMOL/L — LOW (ref 5–17)
BUN SERPL-MCNC: 22 MG/DL — SIGNIFICANT CHANGE UP (ref 7–23)
CALCIUM SERPL-MCNC: 8.5 MG/DL — SIGNIFICANT CHANGE UP (ref 8.5–10.1)
CHLORIDE SERPL-SCNC: 109 MMOL/L — HIGH (ref 96–108)
CO2 SERPL-SCNC: 25 MMOL/L — SIGNIFICANT CHANGE UP (ref 22–31)
CREAT SERPL-MCNC: 0.72 MG/DL — SIGNIFICANT CHANGE UP (ref 0.5–1.3)
CULTURE RESULTS: SIGNIFICANT CHANGE UP
EGFR: 92 ML/MIN/1.73M2 — SIGNIFICANT CHANGE UP
GLUCOSE SERPL-MCNC: 86 MG/DL — SIGNIFICANT CHANGE UP (ref 70–99)
HCT VFR BLD CALC: 23.3 % — LOW (ref 34.5–45)
HCT VFR BLD CALC: 24.8 % — LOW (ref 34.5–45)
HGB BLD-MCNC: 7.1 G/DL — LOW (ref 11.5–15.5)
HGB BLD-MCNC: 7.6 G/DL — LOW (ref 11.5–15.5)
MAGNESIUM SERPL-MCNC: 2.2 MG/DL — SIGNIFICANT CHANGE UP (ref 1.6–2.6)
MCHC RBC-ENTMCNC: 27.5 PG — SIGNIFICANT CHANGE UP (ref 27–34)
MCHC RBC-ENTMCNC: 30.5 GM/DL — LOW (ref 32–36)
MCV RBC AUTO: 90.3 FL — SIGNIFICANT CHANGE UP (ref 80–100)
NRBC # BLD: 0 /100 WBCS — SIGNIFICANT CHANGE UP (ref 0–0)
OB PNL STL: NEGATIVE — SIGNIFICANT CHANGE UP
PLATELET # BLD AUTO: 134 K/UL — LOW (ref 150–400)
POTASSIUM SERPL-MCNC: 4.2 MMOL/L — SIGNIFICANT CHANGE UP (ref 3.5–5.3)
POTASSIUM SERPL-SCNC: 4.2 MMOL/L — SIGNIFICANT CHANGE UP (ref 3.5–5.3)
RBC # BLD: 2.58 M/UL — LOW (ref 3.8–5.2)
RBC # FLD: 17.9 % — HIGH (ref 10.3–14.5)
SARS-COV-2 RNA SPEC QL NAA+PROBE: SIGNIFICANT CHANGE UP
SODIUM SERPL-SCNC: 136 MMOL/L — SIGNIFICANT CHANGE UP (ref 135–145)
SPECIMEN SOURCE: SIGNIFICANT CHANGE UP
WBC # BLD: 13.21 K/UL — HIGH (ref 3.8–10.5)
WBC # FLD AUTO: 13.21 K/UL — HIGH (ref 3.8–10.5)

## 2023-05-27 PROCEDURE — 99232 SBSQ HOSP IP/OBS MODERATE 35: CPT

## 2023-05-27 RX ADMIN — Medication 250 MILLIGRAM(S): at 05:38

## 2023-05-27 RX ADMIN — Medication 25 MILLIGRAM(S): at 05:38

## 2023-05-27 RX ADMIN — Medication 250 MILLIGRAM(S): at 17:16

## 2023-05-27 RX ADMIN — Medication 100 MILLIGRAM(S): at 05:43

## 2023-05-27 RX ADMIN — ENOXAPARIN SODIUM 40 MILLIGRAM(S): 100 INJECTION SUBCUTANEOUS at 05:38

## 2023-05-27 RX ADMIN — Medication 650 MILLIGRAM(S): at 23:32

## 2023-05-27 RX ADMIN — LISINOPRIL 10 MILLIGRAM(S): 2.5 TABLET ORAL at 05:38

## 2023-05-27 RX ADMIN — Medication 100 MILLIGRAM(S): at 21:56

## 2023-05-27 RX ADMIN — Medication 100 MILLIGRAM(S): at 14:13

## 2023-05-27 RX ADMIN — Medication 650 MILLIGRAM(S): at 23:02

## 2023-05-27 RX ADMIN — Medication 3 MILLIGRAM(S): at 23:02

## 2023-05-27 RX ADMIN — Medication 325 MILLIGRAM(S): at 11:24

## 2023-05-27 NOTE — PROGRESS NOTE ADULT - SUBJECTIVE AND OBJECTIVE BOX
Patient is a 68y old  Female who presents with a chief complaint of mechanical fall, right hip pain, inability to ambulate due to pain.        INTERVAL HPI/OVERNIGHT EVENTS:  Pt states she still has pain in the right pelvic area worse with standing/walking, but the pain is getting better overall. Right elbow area less tender. Pt denies dysuria, flank pain, cough, SOB, abd pain, diarrhea. Pt denies melena, hematochezia, hematemesis, hematuria or other signs of bleeding.     MEDICATIONS  (STANDING):  ceFAZolin   IVPB      ceFAZolin   IVPB 2000 milliGRAM(s) IV Intermittent every 8 hours  enoxaparin Injectable 40 milliGRAM(s) SubCutaneous every 24 hours  ferrous    sulfate 325 milliGRAM(s) Oral daily  lisinopril 10 milliGRAM(s) Oral daily  metoprolol succinate ER 25 milliGRAM(s) Oral daily  saccharomyces boulardii 250 milliGRAM(s) Oral two times a day    MEDICATIONS  (PRN):  acetaminophen     Tablet .. 650 milliGRAM(s) Oral every 6 hours PRN Temp greater or equal to 38C (100.4F), Mild Pain (1 - 3)  aluminum hydroxide/magnesium hydroxide/simethicone Suspension 30 milliLiter(s) Oral every 4 hours PRN Dyspepsia  melatonin 3 milliGRAM(s) Oral at bedtime PRN Insomnia  morphine  - Injectable 2 milliGRAM(s) IV Push every 6 hours PRN Severe Pain (7 - 10)  ondansetron Injectable 4 milliGRAM(s) IV Push every 8 hours PRN Nausea and/or Vomiting  oxycodone    5 mG/acetaminophen 325 mG 1 Tablet(s) Oral every 6 hours PRN Moderate Pain (4 - 6)        Allergies    No Known Allergies    Intolerances        REVIEW OF SYSTEMS:  CONSTITUTIONAL: No fever or chills  HEENT:  No headache, no sore throat  RESPIRATORY: No cough, wheezing, or shortness of breath  CARDIOVASCULAR: No chest pain, palpitations  GASTROINTESTINAL: No abd pain, nausea, vomiting, or diarrhea  GENITOURINARY: No dysuria, frequency, or hematuria  NEUROLOGICAL: no focal weakness or dizziness  MUSCULOSKELETAL: +pain in right pelvic area (improving)      Vital Signs Last 24 Hrs  T(C): 36.9 (27 May 2023 12:44), Max: 36.9 (27 May 2023 12:44)  T(F): 98.4 (27 May 2023 12:44), Max: 98.4 (27 May 2023 12:44)  HR: 77 (27 May 2023 12:44) (69 - 78)  BP: 128/69 (27 May 2023 12:44) (113/70 - 128/69)  BP(mean): --  RR: 18 (27 May 2023 12:44) (18 - 18)  SpO2: 96% (27 May 2023 12:44) (96% - 98%)    Parameters below as of 27 May 2023 12:44  Patient On (Oxygen Delivery Method): room air      PHYSICAL EXAM:  GENERAL: NAD  HEENT:  anicteric, moist mucous membranes  CHEST/LUNG:  CTA b/l, no rales, wheezes, or rhonchi  HEART:  RRR, S1, S2, +systolic murmur  ABDOMEN:  BS+, soft, nontender, nondistended ; ecchymoses at site of lovenox injection and left side of torso  EXTREMITIES: + mild tenderness on skin with resolving erythema around right elbow healing scab. No drainage. No cyanosis, or calf tenderness  NERVOUS SYSTEM: answers questions and follows commands appropriately        LABS:                        7.6    x     )-----------( x        ( 27 May 2023 12:45 )             24.8     CBC Full  -  ( 27 May 2023 12:45 )  WBC Count : x  Hemoglobin : 7.6 g/dL  Hematocrit : 24.8 %  Platelet Count - Automated : x  Mean Cell Volume : x  Mean Cell Hemoglobin : x  Mean Cell Hemoglobin Concentration : x  Auto Neutrophil # : x  Auto Lymphocyte # : x  Auto Monocyte # : x  Auto Eosinophil # : x  Auto Basophil # : x  Auto Neutrophil % : x  Auto Lymphocyte % : x  Auto Monocyte % : x  Auto Eosinophil % : x  Auto Basophil % : x        136  |  109<H>  |  22  ----------------------------<  86  4.2   |  25  |  0.72    Ca    8.5      27 May 2023 07:04  Phos  2.9     05-  Mg     2.2     -        Culture - Blood (collected 24 May 2023 21:45)  Source: .Blood Blood-Peripheral  Preliminary Report (26 May 2023 01:03):    No growth to date.    Culture - Blood (collected 24 May 2023 21:40)  Source: .Blood Blood-Peripheral  Preliminary Report (26 May 2023 01:03):    No growth to date.      Culture - Urine (collected 25 May 2023 13:00)  Source: Clean Catch Clean Catch (Midstream)  Final Report (27 May 2023 11:31):    >=3 organisms. Probable collection contamination.      Urinalysis Basic - ( 25 May 2023 13:00 )    Color: Pale Yellow / Appearance: Clear / S.005 / pH: x  Gluc: x / Ketone: Negative  / Bili: Negative / Urobili: Negative   Blood: x / Protein: Negative / Nitrite: Negative   Leuk Esterase: Negative / RBC: 0-2 /HPF / WBC 0-2   Sq Epi: x / Non Sq Epi: x / Bacteria: Few      CAPILLARY BLOOD GLUCOSE        RADIOLOGY & ADDITIONAL TESTS:    Personally reviewed.     Consultant(s) Notes Reviewed:  [x] YES  [ ] NO

## 2023-05-27 NOTE — PROGRESS NOTE ADULT - TIME BILLING
Note written by attending, see above.  Time spent: 40min. More than 50% of the visit was spent counseling the patient and pt's daughter on medical condition - right-sided superior and inferior pubic rami fractures, right sacral ala fracture, now with suspected right UE cellulitis, Doppler US of LEs reason for test and negative result, anemia. Note written by attending, see above.  Time spent: 40min. More than 50% of the visit was spent counseling the patient and pt's daughter on medical condition - right-sided superior and inferior pubic rami fractures, right sacral ala fracture, now with suspected right UE cellulitis, Doppler US of LEs reason for test and negative result, anemia.    Disp: PAULINE working on TENISHA placement.

## 2023-05-27 NOTE — SOCIAL WORK PROGRESS NOTE - NSSWPROGRESSNOTE_GEN_ALL_CORE
Per ines facilities applies to all are able to accept pt. Sw spoke to dtr yadira at [phone] in re to above. Dtr states [facility] is her first choice. Per ci they would be able to accept Tuesday after holiday weekend. It appears that [facility] and [facility] admissions closed as well. Messages left in re to possibility of accepting pt over weekend as pt may be ready later today or tomorrow.

## 2023-05-27 NOTE — PROGRESS NOTE ADULT - PROBLEM SELECTOR PLAN 2
- WBC 20.90 on admission was suspected to be reactive from stress response from fall and fractures  - WBC downtrended to ~16, but had fever overnight and signs of early cellulitis at right elbow where pt had a skin laceration that has been healing.   - ID (TREV Juarez), recs appreciated   - will treat with Ancef for suspected cellulitis and f/up cultures sent overnight when pt had the fever  - also checked Doppler LEs to r/out acute DVT to have caused the low grade fever -- Doppler negative for DVT  - leukocytosis at 13.2 today  - BCx NGTD, UA negative, CXR clear   - will transition to Keflex on discharge

## 2023-05-28 LAB
ANION GAP SERPL CALC-SCNC: 4 MMOL/L — LOW (ref 5–17)
BUN SERPL-MCNC: 21 MG/DL — SIGNIFICANT CHANGE UP (ref 7–23)
CALCIUM SERPL-MCNC: 9.1 MG/DL — SIGNIFICANT CHANGE UP (ref 8.5–10.1)
CHLORIDE SERPL-SCNC: 105 MMOL/L — SIGNIFICANT CHANGE UP (ref 96–108)
CO2 SERPL-SCNC: 29 MMOL/L — SIGNIFICANT CHANGE UP (ref 22–31)
CREAT SERPL-MCNC: 0.76 MG/DL — SIGNIFICANT CHANGE UP (ref 0.5–1.3)
EGFR: 86 ML/MIN/1.73M2 — SIGNIFICANT CHANGE UP
GLUCOSE SERPL-MCNC: 88 MG/DL — SIGNIFICANT CHANGE UP (ref 70–99)
HCT VFR BLD CALC: 24.3 % — LOW (ref 34.5–45)
HGB BLD-MCNC: 7.4 G/DL — LOW (ref 11.5–15.5)
MCHC RBC-ENTMCNC: 27.4 PG — SIGNIFICANT CHANGE UP (ref 27–34)
MCHC RBC-ENTMCNC: 30.5 GM/DL — LOW (ref 32–36)
MCV RBC AUTO: 90 FL — SIGNIFICANT CHANGE UP (ref 80–100)
NRBC # BLD: 0 /100 WBCS — SIGNIFICANT CHANGE UP (ref 0–0)
PLATELET # BLD AUTO: 187 K/UL — SIGNIFICANT CHANGE UP (ref 150–400)
POTASSIUM SERPL-MCNC: 4.5 MMOL/L — SIGNIFICANT CHANGE UP (ref 3.5–5.3)
POTASSIUM SERPL-SCNC: 4.5 MMOL/L — SIGNIFICANT CHANGE UP (ref 3.5–5.3)
RBC # BLD: 2.7 M/UL — LOW (ref 3.8–5.2)
RBC # FLD: 17.9 % — HIGH (ref 10.3–14.5)
SODIUM SERPL-SCNC: 138 MMOL/L — SIGNIFICANT CHANGE UP (ref 135–145)
WBC # BLD: 13.14 K/UL — HIGH (ref 3.8–10.5)
WBC # FLD AUTO: 13.14 K/UL — HIGH (ref 3.8–10.5)

## 2023-05-28 PROCEDURE — 74176 CT ABD & PELVIS W/O CONTRAST: CPT | Mod: 26

## 2023-05-28 PROCEDURE — 99233 SBSQ HOSP IP/OBS HIGH 50: CPT

## 2023-05-28 RX ORDER — ENOXAPARIN SODIUM 100 MG/ML
40 INJECTION SUBCUTANEOUS EVERY 24 HOURS
Refills: 0 | Status: DISCONTINUED | OUTPATIENT
Start: 2023-05-28 | End: 2023-06-02

## 2023-05-28 RX ORDER — DIPHENHYDRAMINE HCL 50 MG
25 CAPSULE ORAL DAILY
Refills: 0 | Status: DISCONTINUED | OUTPATIENT
Start: 2023-05-28 | End: 2023-06-02

## 2023-05-28 RX ORDER — HYDROCORTISONE 1 %
1 OINTMENT (GRAM) TOPICAL ONCE
Refills: 0 | Status: COMPLETED | OUTPATIENT
Start: 2023-05-28 | End: 2023-05-28

## 2023-05-28 RX ADMIN — Medication 250 MILLIGRAM(S): at 17:21

## 2023-05-28 RX ADMIN — Medication 325 MILLIGRAM(S): at 11:27

## 2023-05-28 RX ADMIN — Medication 100 MILLIGRAM(S): at 21:17

## 2023-05-28 RX ADMIN — Medication 250 MILLIGRAM(S): at 05:11

## 2023-05-28 RX ADMIN — Medication 100 MILLIGRAM(S): at 05:04

## 2023-05-28 RX ADMIN — ENOXAPARIN SODIUM 40 MILLIGRAM(S): 100 INJECTION SUBCUTANEOUS at 17:23

## 2023-05-28 RX ADMIN — Medication 25 MILLIGRAM(S): at 05:04

## 2023-05-28 RX ADMIN — Medication 1 APPLICATION(S): at 00:41

## 2023-05-28 RX ADMIN — Medication 100 MILLIGRAM(S): at 14:05

## 2023-05-28 RX ADMIN — LISINOPRIL 10 MILLIGRAM(S): 2.5 TABLET ORAL at 05:04

## 2023-05-28 RX ADMIN — Medication 3 MILLIGRAM(S): at 22:15

## 2023-05-28 NOTE — PROGRESS NOTE ADULT - PROBLEM SELECTOR PLAN 2
- WBC 20.90 on admission was suspected to be reactive from stress response from fall and fractures  - WBC downtrended to ~16, but had fever overnight and signs of early cellulitis at right elbow where pt had a skin laceration that has been healing.   - ID (TREV Juarez), recs appreciated   - will treat with Ancef for suspected cellulitis and f/up cultures sent overnight when pt had the fever  - also checked Doppler LEs to r/out acute DVT to have caused the low grade fever -- Doppler negative for DVT  - leukocytosis improved, but plateauing ~13 now  - given pt's symptoms including another bout of sweats and had some decrease in H&H since start of admission, checked CT Abd/P to eval for any sign of intra-abdominal hematoma or other sign of infection -- CT without hematoma or signs of active infections.   - BCx NGTD, UA negative, CXR clear   - will transition to Keflex on discharge

## 2023-05-28 NOTE — SOCIAL WORK PROGRESS NOTE - NSSWPROGRESSNOTE_GEN_ALL_CORE
Reached out to facilities to follow up on possible discharge today. There have been no responses yet for a weekend admission.

## 2023-05-28 NOTE — PROVIDER CONTACT NOTE (OTHER) - REASON
Pt febrile, 100.7, Pt asymptomatic
Patient requesting cream for itching
patient have multiple pauses on TELE

## 2023-05-28 NOTE — PROGRESS NOTE ADULT - SUBJECTIVE AND OBJECTIVE BOX
Patient is a 68y old  Female who presents with a chief complaint of mechanical fall, right hip pain, inability to ambulate due to pain.        INTERVAL HPI/OVERNIGHT EVENTS:  Pt states she still has pain in the right pelvic area worse with standing/walking, but the pain is improving overall. Right elbow area less tender. Admits having sweats yesterday evening. No fever, chills, dysuria, flank pain, cough, SOB, abd pain, diarrhea. Pt denies melena, hematochezia, hematemesis, hematuria or other signs of bleeding.     MEDICATIONS  (STANDING):  ceFAZolin   IVPB 2000 milliGRAM(s) IV Intermittent every 8 hours  ceFAZolin   IVPB      enoxaparin Injectable 40 milliGRAM(s) SubCutaneous every 24 hours  ferrous    sulfate 325 milliGRAM(s) Oral daily  lisinopril 10 milliGRAM(s) Oral daily  metoprolol succinate ER 25 milliGRAM(s) Oral daily  saccharomyces boulardii 250 milliGRAM(s) Oral two times a day    MEDICATIONS  (PRN):  acetaminophen     Tablet .. 650 milliGRAM(s) Oral every 6 hours PRN Temp greater or equal to 38C (100.4F), Mild Pain (1 - 3)  aluminum hydroxide/magnesium hydroxide/simethicone Suspension 30 milliLiter(s) Oral every 4 hours PRN Dyspepsia  diphenhydrAMINE 25 milliGRAM(s) Oral daily PRN Rash and/or Itching  melatonin 3 milliGRAM(s) Oral at bedtime PRN Insomnia  morphine  - Injectable 2 milliGRAM(s) IV Push every 6 hours PRN Severe Pain (7 - 10)  ondansetron Injectable 4 milliGRAM(s) IV Push every 8 hours PRN Nausea and/or Vomiting  oxycodone    5 mG/acetaminophen 325 mG 1 Tablet(s) Oral every 6 hours PRN Moderate Pain (4 - 6)          Allergies    No Known Allergies    Intolerances        REVIEW OF SYSTEMS:  CONSTITUTIONAL: admits having sweats in the evening; No fever or chills  HEENT:  No headache, no sore throat  RESPIRATORY: No cough, wheezing, or shortness of breath  CARDIOVASCULAR: No chest pain, palpitations  GASTROINTESTINAL: No abd pain, nausea, vomiting, or diarrhea  GENITOURINARY: No dysuria, frequency, or hematuria  NEUROLOGICAL: no focal weakness or dizziness  MUSCULOSKELETAL: +pain in right pelvic area (improving)      Vital Signs Last 24 Hrs  T(C): 36.8 (28 May 2023 20:45), Max: 36.8 (28 May 2023 13:35)  T(F): 98.2 (28 May 2023 20:45), Max: 98.2 (28 May 2023 13:35)  HR: 80 (28 May 2023 20:45) (70 - 80)  BP: 123/71 (28 May 2023 20:45) (108/62 - 128/75)  BP(mean): --  RR: 18 (28 May 2023 20:45) (17 - 18)  SpO2: 96% (28 May 2023 20:45) (96% - 97%)    Parameters below as of 28 May 2023 13:35  Patient On (Oxygen Delivery Method): room air        PHYSICAL EXAM:  GENERAL: NAD  HEENT:  anicteric, moist mucous membranes  CHEST/LUNG:  CTA b/l, no rales, wheezes, or rhonchi  HEART:  RRR, S1, S2, +systolic murmur  ABDOMEN:  BS+, soft, nontender, nondistended ; ecchymoses at site of lovenox injection and left side of torso  EXTREMITIES: + mild tenderness on skin without surrounding erythema around right elbow healing scab. No drainage. No cyanosis, or calf tenderness  NERVOUS SYSTEM: answers questions and follows commands appropriately        LABS:                                   7.4    13.14 )-----------( 187      ( 28 May 2023 06:28 )             24.3     CBC Full  -  ( 28 May 2023 06:28 )  WBC Count : 13.14 K/uL  Hemoglobin : 7.4 g/dL  Hematocrit : 24.3 %  Platelet Count - Automated : 187 K/uL  Mean Cell Volume : 90.0 fl  Mean Cell Hemoglobin : 27.4 pg  Mean Cell Hemoglobin Concentration : 30.5 gm/dL  Auto Neutrophil # : x  Auto Lymphocyte # : x  Auto Monocyte # : x  Auto Eosinophil # : x  Auto Basophil # : x  Auto Neutrophil % : x  Auto Lymphocyte % : x  Auto Monocyte % : x  Auto Eosinophil % : x  Auto Basophil % : x    -    138  |  105  |  21  ----------------------------<  88  4.5   |  29  |  0.76    Ca    9.1      28 May 2023 06:28  Mg     2.2     05-27            Culture - Urine (collected 25 May 2023 13:00)  Source: Clean Catch Clean Catch (Midstream)  Final Report (27 May 2023 11:31):    >=3 organisms. Probable collection contamination.    Culture - Blood (collected 24 May 2023 21:45)  Source: .Blood Blood-Peripheral  Preliminary Report (26 May 2023 01:03):    No growth to date.    Culture - Blood (collected 24 May 2023 21:40)  Source: .Blood Blood-Peripheral  Preliminary Report (26 May 2023 01:03):    No growth to date.          Urinalysis Basic - ( 25 May 2023 13:00 )    Color: Pale Yellow / Appearance: Clear / S.005 / pH: x  Gluc: x / Ketone: Negative  / Bili: Negative / Urobili: Negative   Blood: x / Protein: Negative / Nitrite: Negative   Leuk Esterase: Negative / RBC: 0-2 /HPF / WBC 0-2   Sq Epi: x / Non Sq Epi: x / Bacteria: Few      CAPILLARY BLOOD GLUCOSE        RADIOLOGY & ADDITIONAL TESTS:    Personally reviewed.     Consultant(s) Notes Reviewed:  [x] YES  [ ] NO     Patient is a 68y old  Female who presents with a chief complaint of mechanical fall, right hip pain, inability to ambulate due to pain.        INTERVAL HPI/OVERNIGHT EVENTS:  Pt states she still has pain in the right pelvic area worse with standing/walking, but the pain is improving overall. Right elbow area less tender. Admits having sweats yesterday evening. No fever, chills, dysuria, flank pain, cough, SOB, abd pain, diarrhea. Pt denies melena, hematochezia, hematemesis, hematuria or other signs of bleeding. Of note, pt states she had some skin itchiness last night without rash and requested a PRN dose of benadryl if recurs.     MEDICATIONS  (STANDING):  ceFAZolin   IVPB 2000 milliGRAM(s) IV Intermittent every 8 hours  ceFAZolin   IVPB      enoxaparin Injectable 40 milliGRAM(s) SubCutaneous every 24 hours  ferrous    sulfate 325 milliGRAM(s) Oral daily  lisinopril 10 milliGRAM(s) Oral daily  metoprolol succinate ER 25 milliGRAM(s) Oral daily  saccharomyces boulardii 250 milliGRAM(s) Oral two times a day    MEDICATIONS  (PRN):  acetaminophen     Tablet .. 650 milliGRAM(s) Oral every 6 hours PRN Temp greater or equal to 38C (100.4F), Mild Pain (1 - 3)  aluminum hydroxide/magnesium hydroxide/simethicone Suspension 30 milliLiter(s) Oral every 4 hours PRN Dyspepsia  diphenhydrAMINE 25 milliGRAM(s) Oral daily PRN Rash and/or Itching  melatonin 3 milliGRAM(s) Oral at bedtime PRN Insomnia  morphine  - Injectable 2 milliGRAM(s) IV Push every 6 hours PRN Severe Pain (7 - 10)  ondansetron Injectable 4 milliGRAM(s) IV Push every 8 hours PRN Nausea and/or Vomiting  oxycodone    5 mG/acetaminophen 325 mG 1 Tablet(s) Oral every 6 hours PRN Moderate Pain (4 - 6)          Allergies    No Known Allergies    Intolerances        REVIEW OF SYSTEMS:  CONSTITUTIONAL: admits having sweats in the evening; No fever or chills  HEENT:  No headache, no sore throat  RESPIRATORY: No cough, wheezing, or shortness of breath  CARDIOVASCULAR: No chest pain, palpitations  GASTROINTESTINAL: No abd pain, nausea, vomiting, or diarrhea  GENITOURINARY: No dysuria, frequency, or hematuria  NEUROLOGICAL: no focal weakness or dizziness  MUSCULOSKELETAL: +pain in right pelvic area (improving)      Vital Signs Last 24 Hrs  T(C): 36.8 (28 May 2023 20:45), Max: 36.8 (28 May 2023 13:35)  T(F): 98.2 (28 May 2023 20:45), Max: 98.2 (28 May 2023 13:35)  HR: 80 (28 May 2023 20:45) (70 - 80)  BP: 123/71 (28 May 2023 20:45) (108/62 - 128/75)  BP(mean): --  RR: 18 (28 May 2023 20:45) (17 - 18)  SpO2: 96% (28 May 2023 20:45) (96% - 97%)    Parameters below as of 28 May 2023 13:35  Patient On (Oxygen Delivery Method): room air        PHYSICAL EXAM:  GENERAL: NAD  HEENT:  anicteric, moist mucous membranes  CHEST/LUNG:  CTA b/l, no rales, wheezes, or rhonchi  HEART:  RRR, S1, S2, +systolic murmur  ABDOMEN:  BS+, soft, nontender, nondistended ; ecchymoses at site of lovenox injection and left side of torso  EXTREMITIES: + mild tenderness on skin without surrounding erythema around right elbow healing scab. No drainage. No cyanosis, or calf tenderness  NERVOUS SYSTEM: answers questions and follows commands appropriately        LABS:                                   7.4    13.14 )-----------( 187      ( 28 May 2023 06:28 )             24.3     CBC Full  -  ( 28 May 2023 06:28 )  WBC Count : 13.14 K/uL  Hemoglobin : 7.4 g/dL  Hematocrit : 24.3 %  Platelet Count - Automated : 187 K/uL  Mean Cell Volume : 90.0 fl  Mean Cell Hemoglobin : 27.4 pg  Mean Cell Hemoglobin Concentration : 30.5 gm/dL  Auto Neutrophil # : x  Auto Lymphocyte # : x  Auto Monocyte # : x  Auto Eosinophil # : x  Auto Basophil # : x  Auto Neutrophil % : x  Auto Lymphocyte % : x  Auto Monocyte % : x  Auto Eosinophil % : x  Auto Basophil % : x    -    138  |  105  |  21  ----------------------------<  88  4.5   |  29  |  0.76    Ca    9.1      28 May 2023 06:28  Mg     2.2     05-27            Culture - Urine (collected 25 May 2023 13:00)  Source: Clean Catch Clean Catch (Midstream)  Final Report (27 May 2023 11:31):    >=3 organisms. Probable collection contamination.    Culture - Blood (collected 24 May 2023 21:45)  Source: .Blood Blood-Peripheral  Preliminary Report (26 May 2023 01:03):    No growth to date.    Culture - Blood (collected 24 May 2023 21:40)  Source: .Blood Blood-Peripheral  Preliminary Report (26 May 2023 01:03):    No growth to date.          Urinalysis Basic - ( 25 May 2023 13:00 )    Color: Pale Yellow / Appearance: Clear / S.005 / pH: x  Gluc: x / Ketone: Negative  / Bili: Negative / Urobili: Negative   Blood: x / Protein: Negative / Nitrite: Negative   Leuk Esterase: Negative / RBC: 0-2 /HPF / WBC 0-2   Sq Epi: x / Non Sq Epi: x / Bacteria: Few      CAPILLARY BLOOD GLUCOSE        RADIOLOGY & ADDITIONAL TESTS:    Personally reviewed.     Consultant(s) Notes Reviewed:  [x] YES  [ ] NO

## 2023-05-28 NOTE — PROVIDER CONTACT NOTE (OTHER) - SITUATION
Pt febrile, 100.7, Pt asymptomatic
Phone call received from [employer] saying pt had 3.0sec then 07.1 and 04.5,then ,4.1 sec pause. [facility]ren made aware. Vitals checked. Safety and comfort maintained.
Patient is requesting a cream for itching in her buttocks area.

## 2023-05-28 NOTE — PROVIDER CONTACT NOTE (OTHER) - ASSESSMENT
Patient buttocks scratched by patient nails.
Pt a&o4, other VSS, denies SOB, chest pain, n/v, dizziness. Pt complains of HA. no s/s of distress.

## 2023-05-28 NOTE — PROGRESS NOTE ADULT - TIME BILLING
Note written by attending, see above.  Time spent: 50min. More than 50% of the visit was spent counseling the patient on medical condition - right-sided superior and inferior pubic rami fractures, right sacral ala fracture, now with suspected right UE cellulitis, Doppler US of LEs reason for test and negative result, anemia, r/out hematoma with CT.    Disp: SW working on [facility] placement -- per SW, [facility] will not have bed for pt until Tuesday.

## 2023-05-29 LAB
ALBUMIN SERPL ELPH-MCNC: 2.9 G/DL — LOW (ref 3.3–5)
ALP SERPL-CCNC: 72 U/L — SIGNIFICANT CHANGE UP (ref 40–120)
ALT FLD-CCNC: 15 U/L — SIGNIFICANT CHANGE UP (ref 12–78)
ANION GAP SERPL CALC-SCNC: 4 MMOL/L — LOW (ref 5–17)
AST SERPL-CCNC: 28 U/L — SIGNIFICANT CHANGE UP (ref 15–37)
BILIRUB SERPL-MCNC: 0.6 MG/DL — SIGNIFICANT CHANGE UP (ref 0.2–1.2)
BUN SERPL-MCNC: 26 MG/DL — HIGH (ref 7–23)
CALCIUM SERPL-MCNC: 8.6 MG/DL — SIGNIFICANT CHANGE UP (ref 8.5–10.1)
CHLORIDE SERPL-SCNC: 101 MMOL/L — SIGNIFICANT CHANGE UP (ref 96–108)
CO2 SERPL-SCNC: 28 MMOL/L — SIGNIFICANT CHANGE UP (ref 22–31)
CREAT SERPL-MCNC: 0.76 MG/DL — SIGNIFICANT CHANGE UP (ref 0.5–1.3)
EGFR: 86 ML/MIN/1.73M2 — SIGNIFICANT CHANGE UP
GLUCOSE SERPL-MCNC: 109 MG/DL — HIGH (ref 70–99)
HCT VFR BLD CALC: 23.6 % — LOW (ref 34.5–45)
HGB BLD-MCNC: 7.2 G/DL — LOW (ref 11.5–15.5)
MCHC RBC-ENTMCNC: 27.9 PG — SIGNIFICANT CHANGE UP (ref 27–34)
MCHC RBC-ENTMCNC: 30.5 GM/DL — LOW (ref 32–36)
MCV RBC AUTO: 91.5 FL — SIGNIFICANT CHANGE UP (ref 80–100)
NRBC # BLD: 0 /100 WBCS — SIGNIFICANT CHANGE UP (ref 0–0)
PLATELET # BLD AUTO: 173 K/UL — SIGNIFICANT CHANGE UP (ref 150–400)
POTASSIUM SERPL-MCNC: 4.2 MMOL/L — SIGNIFICANT CHANGE UP (ref 3.5–5.3)
POTASSIUM SERPL-SCNC: 4.2 MMOL/L — SIGNIFICANT CHANGE UP (ref 3.5–5.3)
PROT SERPL-MCNC: 7.2 G/DL — SIGNIFICANT CHANGE UP (ref 6–8.3)
RBC # BLD: 2.58 M/UL — LOW (ref 3.8–5.2)
RBC # BLD: 2.58 M/UL — LOW (ref 3.8–5.2)
RBC # FLD: 17.8 % — HIGH (ref 10.3–14.5)
RETICS #: 56.8 K/UL — SIGNIFICANT CHANGE UP (ref 25–125)
RETICS/RBC NFR: 2.2 % — SIGNIFICANT CHANGE UP (ref 0.5–2.5)
SODIUM SERPL-SCNC: 133 MMOL/L — LOW (ref 135–145)
WBC # BLD: 12.84 K/UL — HIGH (ref 3.8–10.5)
WBC # FLD AUTO: 12.84 K/UL — HIGH (ref 3.8–10.5)

## 2023-05-29 PROCEDURE — 99233 SBSQ HOSP IP/OBS HIGH 50: CPT

## 2023-05-29 RX ADMIN — Medication 100 MILLIGRAM(S): at 21:29

## 2023-05-29 RX ADMIN — Medication 250 MILLIGRAM(S): at 05:17

## 2023-05-29 RX ADMIN — Medication 250 MILLIGRAM(S): at 17:44

## 2023-05-29 RX ADMIN — Medication 100 MILLIGRAM(S): at 14:00

## 2023-05-29 RX ADMIN — Medication 650 MILLIGRAM(S): at 10:15

## 2023-05-29 RX ADMIN — Medication 100 MILLIGRAM(S): at 05:17

## 2023-05-29 RX ADMIN — Medication 3 MILLIGRAM(S): at 23:14

## 2023-05-29 RX ADMIN — Medication 325 MILLIGRAM(S): at 11:02

## 2023-05-29 RX ADMIN — Medication 650 MILLIGRAM(S): at 09:15

## 2023-05-29 RX ADMIN — ENOXAPARIN SODIUM 40 MILLIGRAM(S): 100 INJECTION SUBCUTANEOUS at 17:44

## 2023-05-29 NOTE — DISCHARGE NOTE PROVIDER - CARE PROVIDER_API CALL
Rory Land  Orthopaedic Surgery  [address] [place] [address]  Phone: [phone]  Fax: [phone]  Follow Up Time: 2 weeks    Hon Ashly Robledo  Medical Oncology  [address] [facility], [address] [address]  Phone: [phone]  Fax: [phone]  Follow Up Time: 2 weeks

## 2023-05-29 NOTE — CHART NOTE - NSCHARTNOTEFT_GEN_A_CORE
Called by RN for Pt c/o burning of BL feet .   Patient seen and examined at bedside. States that her feet feel very hot.   States that this has never occurred before. No associated pain.        T(C): 36.6 (05-29-23 @ 19:31), Max: 36.7 (05-29-23 @ 11:29)  HR: 93 (05-29-23 @ 19:31) (73 - 93)  BP: 127/67 (05-29-23 @ 19:31) (107/64 - 138/78)  RR: 17 (05-29-23 @ 19:31) (17 - 18)  SpO2: 98% (05-29-23 @ 19:31) (93% - 98%)  Wt(kg): --    Physical :  Gen- NAD, ncat  Cardio - s+1,s+2, rrr, no murmur  Lung - cta b/l, no wheeze, no rhonchi, no rales   Abdomen- +BS, NT/ND, no guarding, no rebound, no masses  Ext- no edema, 2+ pulses b/l  Neuro- 5/5 strength in dorsiflexion/plantarflexion; sensation intact BL     LABS:                        7.2    12.84 )-----------( 173      ( 29 May 2023 06:35 )             23.6     05-29    133<L>  |  101  |  26<H>  ----------------------------<  109<H>  4.2   |  28  |  0.76    Ca    8.6      29 May 2023 06:35    TPro  7.2  /  Alb  2.9<L>  /  TBili  0.6  /  DBili  x   /  AST  28  /  ALT  15  /  AlkPhos  72  05-29      PLAN   - called for pt complaining of burning of BL feet   - Advised nurse to place ice packs   - RN to call and monitor for changes

## 2023-05-29 NOTE — DISCHARGE NOTE PROVIDER - NSDCMRMEDTOKEN_GEN_ALL_CORE_FT
ferrous sulfate 325 mg (65 mg elemental iron) oral delayed release tablet: 1 orally once a day  lisinopril 10 mg oral tablet: 1 orally once a day  metoprolol succinate 25 mg oral capsule, extended release: 1 orally once a day   acetaminophen 325 mg oral tablet: 2 tab(s) orally every 6 hours As needed Temp greater or equal to 38C (100.4F), Mild Pain (1 - 3)  enoxaparin: 40 milligram(s) subcutaneous once a day until patient is more mobile.  ferrous sulfate 325 mg (65 mg elemental iron) oral delayed release tablet: 1 orally once a day  lisinopril 10 mg oral tablet: 1 orally once a day  metoprolol succinate 25 mg oral capsule, extended release: 1 orally once a day  MiraLax oral powder for reconstitution: 17 gram(s) orally once a day as needed for  constipation  oxycodone-acetaminophen 5 mg-325 mg oral tablet: 1 tab(s) orally every 6 hours As needed Moderate Pain (4 - 6)  oxycodone-acetaminophen 5 mg-325 mg oral tablet: 2 tab(s) orally every 6 hours As needed Severe Pain (7 - 10)  senna (sennosides) 8.6 mg oral tablet: 2 tab(s) orally once a day (at bedtime)

## 2023-05-29 NOTE — DISCHARGE NOTE PROVIDER - HOSPITAL COURSE
HPI as documented in H&P:  69yo F w/pmhx anemia, HTN, s/p bovine aortic valve replacement, presents to the ED s/p fall. Pt states that around 3:10 she went out to her backyard to plant onions when she tripped over the hose and fell onto the bricks in her backyard. Pt states that she fell on her right side and is unaware if she hit her head. She was unable to get up after the fall and remained on the ground for 2.5 hours. States that she called out for help; however, her  is hard of hearing and has hx of stroke so she is the primary care giver in the house. After a couple of hours, the  went to look for her and found her on the ground in the backyard. He tried to help her up with his cane but was unable. He then called his neighbors for help who then eventually called an ambulance and brought her to the hospital.  ED Course:  Vitals: T 97.8, HR 80, /85, RR 18, spo2 98% on RA  Labs significant for: WBC 20.90 w/left shift, H/H 8.7/27.9,   UA: trace LEC, 3-5 RBC, occasional bacteria  ECG: NSR @ 92 BPM, possible L atrial enlargement, nonspecific ST abnormality, pending official read  CXR: Cardiomegaly, no acute lung pathology on personal read  XR R Hip/Femur/Shoulder: f/u read   CT Head/Cervical Spine: No intracranial bleed, mass effect or depressed skull fracture. No acute fracture or acute subluxation  CT Pelvis: f/u read   Received Morphine 4mg IVP x1, 1L NS bolus in ED      Hospital Course:  Pt a/w traumatic, mechanical fall, resulting in right-sided superior and inferior pubic rami fractures and right sacral ala fracture. Pt had xrays of right elbow, shoulder, hand and wrist which showed no fx or dislocation. Pt had a fever and developed erythema around a healing scab on right elbow. ID [facility]) consulted on the case and rec [facility] for likely developing cellulitis. Cellulitis greatly improved. UA negative. UCx was a contaminated sample. BCx negative. CXR was clear. Pt's hgb trended down and CT abd/P checked which ruled out occult hematoma. Pt given 1un PRBC with appropriate response. Pt likely has underlying bone marrow condition that has contributed to the chronic anemia and likely a chronic mild leukocytosis (pt states WBC was in mid-11s on outpt blood work). Pt will f/up with outpt hematologist regarding anemia and mild leukocytosis.   Phys therapy rec [facility]. Pt improved and was discharged to [facility]. HPI as documented in H&P:  67yo F w/pmhx anemia, HTN, s/p bovine aortic valve replacement, presents to the ED s/p fall. Pt states that around 3:10 she went out to her backyard to plant onions when she tripped over the hose and fell onto the bricks in her backyard. Pt states that she fell on her right side and is unaware if she hit her head. She was unable to get up after the fall and remained on the ground for 2.5 hours. States that she called out for help; however, her  is hard of hearing and has hx of stroke so she is the primary care giver in the house. After a couple of hours, the  went to look for her and found her on the ground in the backyard. He tried to help her up with his cane but was unable. He then called his neighbors for help who then eventually called an ambulance and brought her to the hospital.  ED Course:  Vitals: T 97.8, HR 80, /85, RR 18, spo2 98% on RA  Labs significant for: WBC 20.90 w/left shift, H/H 8.7/27.9,   UA: trace LEC, 3-5 RBC, occasional bacteria  ECG: NSR @ 92 BPM, possible L atrial enlargement, nonspecific ST abnormality, pending official read  CXR: Cardiomegaly, no acute lung pathology on personal read  XR R Hip/Femur/Shoulder: f/u read   CT Head/Cervical Spine: No intracranial bleed, mass effect or depressed skull fracture. No acute fracture or acute subluxation  CT Pelvis: f/u read   Received Morphine 4mg IVP x1, 1L NS bolus in ED       Hospital Course:  Pt a/w traumatic, mechanical fall, resulting in right-sided superior and inferior pubic rami fractures and right sacral ala fracture. Pt had xrays of right elbow, shoulder, hand and wrist which showed no fx or dislocation. Pt had a fever and developed erythema around a healing scab on right elbow. ID Cain) consulted on the case and rec Ancef for likely developing cellulitis. Cellulitis greatly improved. UA negative. UCx was a contaminated sample. BCx negative. CXR was clear. Pt's hgb trended down and CT abd/P checked which ruled out occult hematoma. Pt given 1un PRBC with appropriate response. Pt likely has underlying bone marrow condition that has contributed to the chronic anemia and likely a chronic mild leukocytosis (pt states WBC was in mid-11s on outpt blood work). Pt will f/up with outpt hematologist regarding anemia and mild leukocytosis.   Phys therapy rec [facility]. Pt improved and was discharged to [facility]. HPI as documented in H&P:  67yo F w/pmhx anemia, HTN, s/p bovine aortic valve replacement, presents to the ED s/p fall. Pt states that around 3:10 she went out to her backyard to plant onions when she tripped over the hose and fell onto the bricks in her backyard. Pt states that she fell on her right side and is unaware if she hit her head. She was unable to get up after the fall and remained on the ground for 2.5 hours. States that she called out for help; however, her  is hard of hearing and has hx of stroke so she is the primary care giver in the house. After a couple of hours, the  went to look for her and found her on the ground in the backyard. He tried to help her up with his cane but was unable. He then called his neighbors for help who then eventually called an ambulance and brought her to the hospital.  ED Course:  Vitals: T 97.8, HR 80, /85, RR 18, spo2 98% on RA  Labs significant for: WBC 20.90 w/left shift, H/H 8.7/27.9,   UA: trace LEC, 3-5 RBC, occasional bacteria  ECG: NSR @ 92 BPM, possible L atrial enlargement, nonspecific ST abnormality, pending official read  CXR: Cardiomegaly, no acute lung pathology on personal read  XR R Hip/Femur/Shoulder: Low right pelvic fractures best seen on CAT scan. No acute chest finding. no fracture of right shoulder.  CT Head/Cervical Spine: No intracranial bleed, mass effect or depressed skull fracture. No acute fracture or acute subluxation  CT Pelvis: 1. Acute nondisplaced fractures of the right superior and inferior pubic ramus.  2. Acute comminuted, minimally depressed fracture of the right sacral ala.  Received Morphine 4mg IVP x1, 1L NS bolus in ED       Hospital Course:  Pt a/w traumatic, mechanical fall, resulting in right-sided superior and inferior pubic rami fractures and right sacral ala fracture. Pt had xrays of right elbow, shoulder, hand and wrist which showed no fx or dislocation. Pt had a fever and developed erythema around a healing scab on right elbow. ID Cain) consulted on the case and rec Ancef for likely developing cellulitis. Cellulitis greatly improved. UA negative. UCx was a contaminated sample. BCx negative. CXR was clear. Pt's hgb trended down and CT abd/P checked which ruled out occult hematoma. Pt given 1un PRBC with appropriate response. Stool occult blood was negative.  Pt. was seen by hematology for anemia and leukocytosis.  Leukocytosis believed to be reactive to pelvic fractures.  Pt will f/up with hematologist as outpatient regarding anemia and mild leukocytosis. TTE showed Normal left ventricular function with an EF = 65-70%.  Phys therapy rec [facility]. Pt improved and was discharged to [facility].     On day of discharge patient is in no distress.  Afebrile and hemodynamically stable. HPI as documented in H&P:  67yo F w/pmhx anemia, HTN, s/p bovine aortic valve replacement, presents to the ED s/p fall. Pt states that around 3:10 she went out to her backyard to plant onions when she tripped over the hose and fell onto the bricks in her backyard. Pt states that she fell on her right side and is unaware if she hit her head. She was unable to get up after the fall and remained on the ground for 2.5 hours. States that she called out for help; however, her  is hard of hearing and has hx of stroke so she is the primary care giver in the house. After a couple of hours, the  went to look for her and found her on the ground in the backyard. He tried to help her up with his cane but was unable. He then called his neighbors for help who then eventually called an ambulance and brought her to the hospital.    ED Course:  Vitals: T 97.8, HR 80, /85, RR 18, spo2 98% on RA  Labs significant for: WBC 20.90 w/left shift, H/H 8.7/27.9,   UA: trace LEC, 3-5 RBC, occasional bacteria  ECG: NSR @ 92 BPM, possible L atrial enlargement, nonspecific ST abnormality, pending official read  CXR: Cardiomegaly, no acute lung pathology on personal read  XR R Hip/Femur/Shoulder: Low right pelvic fractures best seen on CAT scan. No acute chest finding. no fracture of right shoulder.  CT Head/Cervical Spine: No intracranial bleed, mass effect or depressed skull fracture. No acute fracture or acute subluxation  CT Pelvis: 1. Acute nondisplaced fractures of the right superior and inferior pubic ramus.  2. Acute comminuted, minimally depressed fracture of the right sacral ala.  Received Morphine 4mg IVP x1, 1L NS bolus in ED      Hospital Course:  Pt a/w traumatic, mechanical fall, resulting in right-sided superior and inferior pubic rami fractures and right sacral ala fracture. Pt had xrays of right elbow, shoulder, hand and wrist which showed no fx or dislocation. Pt had a fever and developed erythema around a healing scab on right elbow. ID Cain) consulted on the case and rec Ancef for likely developing cellulitis. Cellulitis greatly improved. UA negative. UCx was a contaminated sample. BCx negative. CXR was clear. Pt's hgb trended down and CT abd/P checked which ruled out occult hematoma. Pt given 1un PRBC with appropriate response. Stool occult blood was negative.  Pt. was seen by hematology for anemia and leukocytosis.  Leukocytosis believed to be reactive to pelvic fractures.  Pt will f/up with hematologist as outpatient regarding anemia and mild leukocytosis. TTE showed Normal left ventricular function with an EF = 65-70%.  Phys therapy rec [facility]. Pt improved and was discharged to [facility].     On day of discharge patient is in no distress.  Afebrile and hemodynamically stable.

## 2023-05-29 NOTE — PROGRESS NOTE ADULT - SUBJECTIVE AND OBJECTIVE BOX
Patient is a 68y old  Female who presents with a chief complaint of mechanical fall, right hip pain, inability to ambulate due to pain.        INTERVAL HPI/OVERNIGHT EVENTS:  Pt states she still has pain in the right pelvic area worse with standing/walking, but the pain is improving overall. Right elbow area tenderness improved. Admits generalized weakness. No fever, chills, dysuria, flank pain, cough, SOB, abd pain, diarrhea. Pt denies melena, hematochezia, hematemesis, hematuria or other signs of bleeding.     MEDICATIONS  (STANDING):  ceFAZolin   IVPB 2000 milliGRAM(s) IV Intermittent every 8 hours  ceFAZolin   IVPB      enoxaparin Injectable 40 milliGRAM(s) SubCutaneous every 24 hours  ferrous    sulfate 325 milliGRAM(s) Oral daily  lisinopril 10 milliGRAM(s) Oral daily  metoprolol succinate ER 25 milliGRAM(s) Oral daily  saccharomyces boulardii 250 milliGRAM(s) Oral two times a day    MEDICATIONS  (PRN):  acetaminophen     Tablet .. 650 milliGRAM(s) Oral every 6 hours PRN Temp greater or equal to 38C (100.4F), Mild Pain (1 - 3)  aluminum hydroxide/magnesium hydroxide/simethicone Suspension 30 milliLiter(s) Oral every 4 hours PRN Dyspepsia  diphenhydrAMINE 25 milliGRAM(s) Oral daily PRN Rash and/or Itching  melatonin 3 milliGRAM(s) Oral at bedtime PRN Insomnia  morphine  - Injectable 2 milliGRAM(s) IV Push every 6 hours PRN Severe Pain (7 - 10)  ondansetron Injectable 4 milliGRAM(s) IV Push every 8 hours PRN Nausea and/or Vomiting  oxycodone    5 mG/acetaminophen 325 mG 1 Tablet(s) Oral every 6 hours PRN Moderate Pain (4 - 6)      Allergies    No Known Allergies    Intolerances        REVIEW OF SYSTEMS:  CONSTITUTIONAL: No fever or chills  HEENT:  No headache, no sore throat  RESPIRATORY: No cough, wheezing, or shortness of breath  CARDIOVASCULAR: No chest pain, palpitations  GASTROINTESTINAL: No abd pain, nausea, vomiting, or diarrhea  GENITOURINARY: No dysuria, frequency, or hematuria  NEUROLOGICAL: no focal weakness or dizziness  MUSCULOSKELETAL: +pain in right pelvic area (improving)      Vital Signs Last 24 Hrs  T(C): 36.6 (29 May 2023 19:31), Max: 36.7 (29 May 2023 11:29)  T(F): 97.8 (29 May 2023 19:31), Max: 98.1 (29 May 2023 11:29)  HR: 93 (29 May 2023 19:31) (73 - 93)  BP: 127/67 (29 May 2023 19:31) (107/64 - 138/78)  BP(mean): --  RR: 17 (29 May 2023 19:31) (17 - 18)  SpO2: 98% (29 May 2023 19:31) (93% - 98%)    Parameters below as of 29 May 2023 19:31  Patient On (Oxygen Delivery Method): room air        PHYSICAL EXAM:  GENERAL: NAD  HEENT:  anicteric, moist mucous membranes  CHEST/LUNG:  CTA b/l, no rales, wheezes, or rhonchi  HEART:  RRR, S1, S2, +systolic murmur  ABDOMEN:  BS+, soft, nontender, nondistended ; small ecchymoses at site of lovenox injection and left side of torso  EXTREMITIES: right elbow healing scab without surrounding erythema or warmth. No drainage. No cyanosis, or calf tenderness  NERVOUS SYSTEM: answers questions and follows commands appropriately        LABS:                                              7.2    12.84 )-----------( 173      ( 29 May 2023 06:35 )             23.6     CBC Full  -  ( 29 May 2023 06:35 )  WBC Count : 12.84 K/uL  Hemoglobin : 7.2 g/dL  Hematocrit : 23.6 %  Platelet Count - Automated : 173 K/uL  Mean Cell Volume : 91.5 fl  Mean Cell Hemoglobin : 27.9 pg  Mean Cell Hemoglobin Concentration : 30.5 gm/dL  Auto Neutrophil # : x  Auto Lymphocyte # : x  Auto Monocyte # : x  Auto Eosinophil # : x  Auto Basophil # : x  Auto Neutrophil % : x  Auto Lymphocyte % : x  Auto Monocyte % : x  Auto Eosinophil % : x  Auto Basophil % : x    05-29    133<L>  |  101  |  26<H>  ----------------------------<  109<H>  4.2   |  28  |  0.76    Ca    8.6      29 May 2023 06:35    TPro  7.2  /  Alb  2.9<L>  /  TBili  0.6  /  DBili  x   /  AST  28  /  ALT  15  /  AlkPhos  72  05-29        Culture - Urine (collected 25 May 2023 13:00)  Source: Clean Catch Clean Catch (Midstream)  Final Report (27 May 2023 11:31):    >=3 organisms. Probable collection contamination.    Culture - Blood (collected 24 May 2023 21:45)  Source: .Blood Blood-Peripheral  Preliminary Report (26 May 2023 01:03):    No growth to date.    Culture - Blood (collected 24 May 2023 21:40)  Source: .Blood Blood-Peripheral  Preliminary Report (26 May 2023 01:03):    No growth to date.          Urinalysis Basic - ( 25 May 2023 13:00 )    Color: Pale Yellow / Appearance: Clear / S.005 / pH: x  Gluc: x / Ketone: Negative  / Bili: Negative / Urobili: Negative   Blood: x / Protein: Negative / Nitrite: Negative   Leuk Esterase: Negative / RBC: 0-2 /HPF / WBC 0-2   Sq Epi: x / Non Sq Epi: x / Bacteria: Few      CAPILLARY BLOOD GLUCOSE        RADIOLOGY & ADDITIONAL TESTS:    Personally reviewed.     Consultant(s) Notes Reviewed:  [x] YES  [ ] NO

## 2023-05-29 NOTE — PROGRESS NOTE ADULT - TIME BILLING
Note written by attending, see above.  Time spent: 50min. More than 50% of the visit was spent counseling the patient on medical condition - right-sided superior and inferior pubic rami fractures, right sacral ala fracture, now with suspected right UE cellulitis, Doppler US of LEs reason for test and negative result, anemia, r/out hematoma with CT, symptomatic anemia, 1un PRBC.    Disp: SW working on [facility] placement -- per SW, [facility] will not have bed for pt until Tuesday.

## 2023-05-29 NOTE — DISCHARGE NOTE PROVIDER - ATTENDING DISCHARGE PHYSICAL EXAMINATION:
Vitals:  T: 98.2  P: 74  BP:  111/68  RR:  17  SpO2:  95%RA  GENERAL: NAD  HEENT: EOMI, hearing normal, conjunctiva and sclera clear  Chest: CTA bilaterally, no wheezing  CV: S1S2, RRR,   GI: soft, +BS, NT/ND  Musculoskeletal: no edema, right elbow healing scab without surrounding erythema or drainage.   Psychiatric: affect nL, mood nL  Skin: warm and dry

## 2023-05-29 NOTE — DISCHARGE NOTE PROVIDER - PROVIDER TOKENS
PROVIDER:[TOKEN:[[id number]],FOLLOWUP:[2 weeks]],PROVIDER:[TOKEN:[[id number]],FOLLOWUP:[2 weeks]]

## 2023-05-29 NOTE — DISCHARGE NOTE PROVIDER - NSDCCPCAREPLAN_GEN_ALL_CORE_FT
PRINCIPAL DISCHARGE DIAGNOSIS  Diagnosis: Closed fracture of multiple pubic rami, right, initial encounter  Assessment and Plan of Treatment: You were seen by orthopedic surgery and no surgical intervention was warranted.  Please continue analgesia as needed.  You will now be discharged to subacute rehab for physical therapy.  Please follow up with your PMD in 1 week and orthopedic surgery in 2 weeks.      SECONDARY DISCHARGE DIAGNOSES  Diagnosis: Unable to ambulate  Assessment and Plan of Treatment: You will be discharged to subacute rehab for physical therapy.    Diagnosis: Anemia  Assessment and Plan of Treatment: You received 1 unit packed red blood cells with good response.  Please follow up with hematology/oncology.

## 2023-05-30 LAB
A1C WITH ESTIMATED AVERAGE GLUCOSE RESULT: 5.6 % — SIGNIFICANT CHANGE UP (ref 4–5.6)
ANION GAP SERPL CALC-SCNC: 6 MMOL/L — SIGNIFICANT CHANGE UP (ref 5–17)
APPEARANCE UR: CLEAR — SIGNIFICANT CHANGE UP
BACTERIA # UR AUTO: ABNORMAL
BILIRUB UR-MCNC: NEGATIVE — SIGNIFICANT CHANGE UP
BUN SERPL-MCNC: 24 MG/DL — HIGH (ref 7–23)
CALCIUM SERPL-MCNC: 9 MG/DL — SIGNIFICANT CHANGE UP (ref 8.5–10.1)
CHLORIDE SERPL-SCNC: 104 MMOL/L — SIGNIFICANT CHANGE UP (ref 96–108)
CO2 SERPL-SCNC: 27 MMOL/L — SIGNIFICANT CHANGE UP (ref 22–31)
COLOR SPEC: YELLOW — SIGNIFICANT CHANGE UP
CREAT SERPL-MCNC: 0.69 MG/DL — SIGNIFICANT CHANGE UP (ref 0.5–1.3)
CULTURE RESULTS: SIGNIFICANT CHANGE UP
CULTURE RESULTS: SIGNIFICANT CHANGE UP
DIFF PNL FLD: NEGATIVE — SIGNIFICANT CHANGE UP
EGFR: 95 ML/MIN/1.73M2 — SIGNIFICANT CHANGE UP
EPI CELLS # UR: ABNORMAL
ESTIMATED AVERAGE GLUCOSE: 114 MG/DL — SIGNIFICANT CHANGE UP (ref 68–114)
GLUCOSE SERPL-MCNC: 87 MG/DL — SIGNIFICANT CHANGE UP (ref 70–99)
GLUCOSE UR QL: NEGATIVE — SIGNIFICANT CHANGE UP
HCT VFR BLD CALC: 27.5 % — LOW (ref 34.5–45)
HGB BLD-MCNC: 8.4 G/DL — LOW (ref 11.5–15.5)
KETONES UR-MCNC: NEGATIVE — SIGNIFICANT CHANGE UP
LEUKOCYTE ESTERASE UR-ACNC: ABNORMAL
MCHC RBC-ENTMCNC: 27.3 PG — SIGNIFICANT CHANGE UP (ref 27–34)
MCHC RBC-ENTMCNC: 30.5 GM/DL — LOW (ref 32–36)
MCV RBC AUTO: 89.3 FL — SIGNIFICANT CHANGE UP (ref 80–100)
NITRITE UR-MCNC: NEGATIVE — SIGNIFICANT CHANGE UP
NRBC # BLD: 0 /100 WBCS — SIGNIFICANT CHANGE UP (ref 0–0)
PH UR: 5.5 — SIGNIFICANT CHANGE UP (ref 5–8)
PLATELET # BLD AUTO: 192 K/UL — SIGNIFICANT CHANGE UP (ref 150–400)
POTASSIUM SERPL-MCNC: 4.4 MMOL/L — SIGNIFICANT CHANGE UP (ref 3.5–5.3)
POTASSIUM SERPL-SCNC: 4.4 MMOL/L — SIGNIFICANT CHANGE UP (ref 3.5–5.3)
PROT UR-MCNC: NEGATIVE — SIGNIFICANT CHANGE UP
RBC # BLD: 3.08 M/UL — LOW (ref 3.8–5.2)
RBC # FLD: 17.5 % — HIGH (ref 10.3–14.5)
RBC CASTS # UR COMP ASSIST: SIGNIFICANT CHANGE UP /HPF (ref 0–4)
SODIUM SERPL-SCNC: 137 MMOL/L — SIGNIFICANT CHANGE UP (ref 135–145)
SP GR SPEC: 1.02 — SIGNIFICANT CHANGE UP (ref 1–1.03)
SPECIMEN SOURCE: SIGNIFICANT CHANGE UP
SPECIMEN SOURCE: SIGNIFICANT CHANGE UP
UROBILINOGEN FLD QL: 0.2 — SIGNIFICANT CHANGE UP (ref 0.2–1)
WBC # BLD: 12.75 K/UL — HIGH (ref 3.8–10.5)
WBC # FLD AUTO: 12.75 K/UL — HIGH (ref 3.8–10.5)
WBC UR QL: SIGNIFICANT CHANGE UP

## 2023-05-30 PROCEDURE — 99233 SBSQ HOSP IP/OBS HIGH 50: CPT

## 2023-05-30 PROCEDURE — 93306 TTE W/DOPPLER COMPLETE: CPT | Mod: 26

## 2023-05-30 RX ORDER — SIMETHICONE 80 MG/1
80 TABLET, CHEWABLE ORAL ONCE
Refills: 0 | Status: COMPLETED | OUTPATIENT
Start: 2023-05-30 | End: 2023-05-30

## 2023-05-30 RX ADMIN — Medication 100 MILLIGRAM(S): at 21:54

## 2023-05-30 RX ADMIN — Medication 100 MILLIGRAM(S): at 05:28

## 2023-05-30 RX ADMIN — Medication 100 MILLIGRAM(S): at 13:18

## 2023-05-30 RX ADMIN — Medication 325 MILLIGRAM(S): at 11:23

## 2023-05-30 RX ADMIN — LISINOPRIL 10 MILLIGRAM(S): 2.5 TABLET ORAL at 05:27

## 2023-05-30 RX ADMIN — Medication 3 MILLIGRAM(S): at 22:15

## 2023-05-30 RX ADMIN — Medication 25 MILLIGRAM(S): at 05:27

## 2023-05-30 RX ADMIN — SIMETHICONE 80 MILLIGRAM(S): 80 TABLET, CHEWABLE ORAL at 12:28

## 2023-05-30 RX ADMIN — Medication 250 MILLIGRAM(S): at 17:37

## 2023-05-30 RX ADMIN — Medication 250 MILLIGRAM(S): at 05:27

## 2023-05-30 RX ADMIN — ENOXAPARIN SODIUM 40 MILLIGRAM(S): 100 INJECTION SUBCUTANEOUS at 17:37

## 2023-05-30 NOTE — PROGRESS NOTE ADULT - PROBLEM SELECTOR PLAN 6
VTE ppx: c/w lovenox

## 2023-05-30 NOTE — PROGRESS NOTE ADULT - PROBLEM SELECTOR PLAN 4
, likely mild rhabdomyolysis due to fall and being on ground for extended period of time  - rehydrated and CK levels were downtrending
, likely mild rhabdomyolysis due to fall and being on ground for extended period of time  - rehydrated and CK levels downtrended
, likely mild rhabdomyolysis due to fall and being on ground for extended period of time  - rehydrated and CK levels downtrended
, likely mild rhabdomyolysis due to fall and being on ground for extended period of time  - rehydrated and CK levels were downtrending

## 2023-05-30 NOTE — DISCHARGE NOTE NURSING/CASE MANAGEMENT/SOCIAL WORK - PATIENT PORTAL LINK FT
You can access the FollowMyHealth Patient Portal offered by [facility] by registering at the following website: [email or web address]. By joining [employer]’s FollowMyHealth portal, you will also be able to view your health information using other applications (apps) compatible with our system.

## 2023-05-30 NOTE — PROGRESS NOTE ADULT - SUBJECTIVE AND OBJECTIVE BOX
Patient is a 68y old  Female who presents with a chief complaint of mechanical fall, right hip pain, inability to ambulate due to pain.        INTERVAL HPI/OVERNIGHT EVENTS:  Pt states she still has pain in the right pelvic area worse with standing/walking, but the pain is improving overall. Right elbow area tenderness improved. Admits generalized weakness. Today, pt stated she had transient pain in her mid-abdomen that was shifting -- given simethicone and pt reported no further abd pain in the evening. Pt also complained of dysuria -- rechecked UA, which was negative. Pt states she feels nervous about going to [facility] today and wishes to see she will not have return of abd pain overnight. No fever, chills, flank pain, cough, SOB, diarrhea. Pt denies melena, hematochezia, hematemesis, hematuria or other signs of bleeding.     MEDICATIONS  (STANDING):  ceFAZolin   IVPB      ceFAZolin   IVPB 2000 milliGRAM(s) IV Intermittent every 8 hours  enoxaparin Injectable 40 milliGRAM(s) SubCutaneous every 24 hours  ferrous    sulfate 325 milliGRAM(s) Oral daily  lisinopril 10 milliGRAM(s) Oral daily  metoprolol succinate ER 25 milliGRAM(s) Oral daily  saccharomyces boulardii 250 milliGRAM(s) Oral two times a day    MEDICATIONS  (PRN):  acetaminophen     Tablet .. 650 milliGRAM(s) Oral every 6 hours PRN Temp greater or equal to 38C (100.4F), Mild Pain (1 - 3)  aluminum hydroxide/magnesium hydroxide/simethicone Suspension 30 milliLiter(s) Oral every 4 hours PRN Dyspepsia  diphenhydrAMINE 25 milliGRAM(s) Oral daily PRN Rash and/or Itching  melatonin 3 milliGRAM(s) Oral at bedtime PRN Insomnia  morphine  - Injectable 2 milliGRAM(s) IV Push every 6 hours PRN Severe Pain (7 - 10)  ondansetron Injectable 4 milliGRAM(s) IV Push every 8 hours PRN Nausea and/or Vomiting  oxycodone    5 mG/acetaminophen 325 mG 1 Tablet(s) Oral every 6 hours PRN Moderate Pain (4 - 6)        Allergies    No Known Allergies    Intolerances        REVIEW OF SYSTEMS:  CONSTITUTIONAL: No fever or chills  HEENT:  No headache, no sore throat  RESPIRATORY: No cough, wheezing, or shortness of breath  CARDIOVASCULAR: No chest pain, palpitations  GASTROINTESTINAL: had intermittent, transient abd pain; no nausea, vomiting, or diarrhea  GENITOURINARY: +dysuria, no frequency, or hematuria  NEUROLOGICAL: no focal weakness or dizziness  MUSCULOSKELETAL: +pain in right pelvic area (improving)      Vital Signs Last 24 Hrs  T(C): 37 (30 May 2023 19:54), Max: 37 (30 May 2023 19:54)  T(F): 98.6 (30 May 2023 19:54), Max: 98.6 (30 May 2023 19:54)  HR: 78 (30 May 2023 19:54) (70 - 78)  BP: 116/67 (30 May 2023 19:54) (113/70 - 124/72)  BP(mean): --  RR: 18 (30 May 2023 19:54) (17 - 18)  SpO2: 96% (30 May 2023 19:54) (95% - 97%)    Parameters below as of 30 May 2023 19:54  Patient On (Oxygen Delivery Method): room air        PHYSICAL EXAM:  GENERAL: NAD  HEENT:  anicteric, moist mucous membranes  CHEST/LUNG:  CTA b/l, no rales, wheezes, or rhonchi  HEART:  RRR, S1, S2, +systolic murmur  ABDOMEN:  BS+, soft, nontender, nondistended ; small ecchymoses at site of lovenox injection and left side of torso  EXTREMITIES: right elbow healing scab without surrounding erythema or warmth. No drainage. No cyanosis, or calf tenderness  NERVOUS SYSTEM: answers questions and follows commands appropriately        LABS:                                                         8.4    12.75 )-----------( 192      ( 30 May 2023 05:35 )             27.5     CBC Full  -  ( 30 May 2023 05:35 )  WBC Count : 12.75 K/uL  Hemoglobin : 8.4 g/dL  Hematocrit : 27.5 %  Platelet Count - Automated : 192 K/uL  Mean Cell Volume : 89.3 fl  Mean Cell Hemoglobin : 27.3 pg  Mean Cell Hemoglobin Concentration : 30.5 gm/dL  Auto Neutrophil # : x  Auto Lymphocyte # : x  Auto Monocyte # : x  Auto Eosinophil # : x  Auto Basophil # : x  Auto Neutrophil % : x  Auto Lymphocyte % : x  Auto Monocyte % : x  Auto Eosinophil % : x  Auto Basophil % : x    05-30    137  |  104  |  24<H>  ----------------------------<  87  4.4   |  27  |  0.69    Ca    9.0      30 May 2023 05:35            Culture - Urine (collected 25 May 2023 13:00)  Source: Clean Catch Clean Catch (Midstream)  Final Report (27 May 2023 11:31):    >=3 organisms. Probable collection contamination.    Culture - Blood (collected 24 May 2023 21:45)  Source: .Blood Blood-Peripheral  Preliminary Report (26 May 2023 01:03):    No growth to date.    Culture - Blood (collected 24 May 2023 21:40)  Source: .Blood Blood-Peripheral  Preliminary Report (26 May 2023 01:03):    No growth to date.          Urinalysis Basic - ( 25 May 2023 13:00 )    Color: Pale Yellow / Appearance: Clear / S.005 / pH: x  Gluc: x / Ketone: Negative  / Bili: Negative / Urobili: Negative   Blood: x / Protein: Negative / Nitrite: Negative   Leuk Esterase: Negative / RBC: 0-2 /HPF / WBC 0-2   Sq Epi: x / Non Sq Epi: x / Bacteria: Few      CAPILLARY BLOOD GLUCOSE        RADIOLOGY & ADDITIONAL TESTS:    Personally reviewed.     Consultant(s) Notes Reviewed:  [x] YES  [ ] NO     Patient is a 68y old  Female who presents with a chief complaint of mechanical fall, right hip pain, inability to ambulate due to pain.         INTERVAL HPI/OVERNIGHT EVENTS:  Pt states she still has pain in the right pelvic area worse with standing/walking, but the pain is improving overall. Right elbow area tenderness improved. Admits generalized weakness. Today, pt stated she had transient pain in her mid-abdomen that was shifting -- given simethicone and pt reported no further abd pain in the evening. Pt also complained of dysuria -- rechecked UA, which was negative. Pt states she feels nervous about going to [facility] today and wishes to see she will not have return of abd pain overnight. No fever, chills, flank pain, cough, SOB, diarrhea. Pt denies melena, hematochezia, hematemesis, hematuria or other signs of bleeding.     MEDICATIONS  (STANDING):  ceFAZolin   IVPB      ceFAZolin   IVPB 2000 milliGRAM(s) IV Intermittent every 8 hours  enoxaparin Injectable 40 milliGRAM(s) SubCutaneous every 24 hours  ferrous    sulfate 325 milliGRAM(s) Oral daily  lisinopril 10 milliGRAM(s) Oral daily  metoprolol succinate ER 25 milliGRAM(s) Oral daily  saccharomyces boulardii 250 milliGRAM(s) Oral two times a day    MEDICATIONS  (PRN):  acetaminophen     Tablet .. 650 milliGRAM(s) Oral every 6 hours PRN Temp greater or equal to 38C (100.4F), Mild Pain (1 - 3)  aluminum hydroxide/magnesium hydroxide/simethicone Suspension 30 milliLiter(s) Oral every 4 hours PRN Dyspepsia  diphenhydrAMINE 25 milliGRAM(s) Oral daily PRN Rash and/or Itching  melatonin 3 milliGRAM(s) Oral at bedtime PRN Insomnia  morphine  - Injectable 2 milliGRAM(s) IV Push every 6 hours PRN Severe Pain (7 - 10)  ondansetron Injectable 4 milliGRAM(s) IV Push every 8 hours PRN Nausea and/or Vomiting  oxycodone    5 mG/acetaminophen 325 mG 1 Tablet(s) Oral every 6 hours PRN Moderate Pain (4 - 6)        Allergies    No Known Allergies    Intolerances        REVIEW OF SYSTEMS:  CONSTITUTIONAL: No fever or chills  HEENT:  No headache, no sore throat  RESPIRATORY: No cough, wheezing, or shortness of breath  CARDIOVASCULAR: No chest pain, palpitations  GASTROINTESTINAL: had intermittent, transient abd pain; no nausea, vomiting, or diarrhea  GENITOURINARY: +dysuria, no frequency, or hematuria  NEUROLOGICAL: no focal weakness or dizziness  MUSCULOSKELETAL: +pain in right pelvic area (improving)      Vital Signs Last 24 Hrs  T(C): 37 (30 May 2023 19:54), Max: 37 (30 May 2023 19:54)  T(F): 98.6 (30 May 2023 19:54), Max: 98.6 (30 May 2023 19:54)  HR: 78 (30 May 2023 19:54) (70 - 78)  BP: 116/67 (30 May 2023 19:54) (113/70 - 124/72)  BP(mean): --  RR: 18 (30 May 2023 19:54) (17 - 18)  SpO2: 96% (30 May 2023 19:54) (95% - 97%)    Parameters below as of 30 May 2023 19:54  Patient On (Oxygen Delivery Method): room air        PHYSICAL EXAM:  GENERAL: NAD  HEENT:  anicteric, moist mucous membranes  CHEST/LUNG:  CTA b/l, no rales, wheezes, or rhonchi  HEART:  RRR, S1, S2, +systolic murmur  ABDOMEN:  BS+, soft, nontender, nondistended ; small ecchymoses at site of lovenox injection and left side of torso  EXTREMITIES: right elbow healing scab without surrounding erythema or warmth. No drainage. No cyanosis, or calf tenderness  NERVOUS SYSTEM: answers questions and follows commands appropriately        LABS:                                                         8.4    12.75 )-----------( 192      ( 30 May 2023 05:35 )             27.5     CBC Full  -  ( 30 May 2023 05:35 )  WBC Count : 12.75 K/uL  Hemoglobin : 8.4 g/dL  Hematocrit : 27.5 %  Platelet Count - Automated : 192 K/uL  Mean Cell Volume : 89.3 fl  Mean Cell Hemoglobin : 27.3 pg  Mean Cell Hemoglobin Concentration : 30.5 gm/dL  Auto Neutrophil # : x  Auto Lymphocyte # : x  Auto Monocyte # : x  Auto Eosinophil # : x  Auto Basophil # : x  Auto Neutrophil % : x  Auto Lymphocyte % : x  Auto Monocyte % : x  Auto Eosinophil % : x  Auto Basophil % : x    05-30    137  |  104  |  24<H>  ----------------------------<  87  4.4   |  27  |  0.69    Ca    9.0      30 May 2023 05:35            Culture - Urine (collected 25 May 2023 13:00)  Source: Clean Catch Clean Catch (Midstream)  Final Report (27 May 2023 11:31):    >=3 organisms. Probable collection contamination.    Culture - Blood (collected 24 May 2023 21:45)  Source: .Blood Blood-Peripheral  Preliminary Report (26 May 2023 01:03):    No growth to date.    Culture - Blood (collected 24 May 2023 21:40)  Source: .Blood Blood-Peripheral  Preliminary Report (26 May 2023 01:03):    No growth to date.          Urinalysis Basic - ( 25 May 2023 13:00 )    Color: Pale Yellow / Appearance: Clear / S.005 / pH: x  Gluc: x / Ketone: Negative  / Bili: Negative / Urobili: Negative   Blood: x / Protein: Negative / Nitrite: Negative   Leuk Esterase: Negative / RBC: 0-2 /HPF / WBC 0-2   Sq Epi: x / Non Sq Epi: x / Bacteria: Few      CAPILLARY BLOOD GLUCOSE        RADIOLOGY & ADDITIONAL TESTS:    Personally reviewed.     Consultant(s) Notes Reviewed:  [x] YES  [ ] NO

## 2023-05-30 NOTE — PROGRESS NOTE ADULT - PROBLEM SELECTOR PLAN 3
H/H 8.7/27.9 in ED  - Chronic with baseline Hgb ~8 per pt's daughter  - continue home iron supplement   - No signs or symptoms of overt bleeding aside from bruising from fall  - Monitor CBC daily
H/H 8.7/27.9 in ED  - Chronic anemia with baseline Hgb ~8-9 per pt's daughter; though pt states last Hgb check in 03/2023 was 9.5  - continue home iron supplement   - No signs or symptoms of overt bleeding aside from bruising from fall  - Monitor CBC daily - Hgb was 7.1 yesterday morning -- now plateauing ~7.5   - folate and B12 with good stores; ferritin of 1400   - checked CT Abd/P to r/out occult hematoma and there was no hematoma  - Hgb now down to 7.2 and pt with signs of symptomatic anemia -- suspect acute event / hospitalization is exacerbating pt's anemia of chronic disease.   - discussed risks/benefits of PRBC transfusion with pt and she would like to have 1un PRBC -- transfused 1un on 5/29 with appropriate response to Hgb of 8.4 today  - monitor CBC  - outpt heme w/up
H/H 8.7/27.9 in ED  - Chronic anemia with baseline Hgb ~8-9 per pt's daughter  - continue home iron supplement   - No signs or symptoms of overt bleeding aside from bruising from fall  - Monitor CBC daily - Hgb was 7.1 in AM -- repeat at noon was back up to 7.6  - folate and B12 with good stores
H/H 8.7/27.9 in ED  - Chronic anemia with baseline Hgb ~8-9 per pt's daughter  - continue home iron supplement   - No signs or symptoms of overt bleeding aside from bruising from fall  - Monitor CBC daily - Hgb was 7.1 yesterday morning -- now plateauing ~7.5   - folate and B12 with good stores
H/H 8.7/27.9 in ED  - Chronic anemia with baseline Hgb ~8-9 per pt's daughter  - continue home iron supplement   - No signs or symptoms of overt bleeding aside from bruising from fall  - Monitor CBC daily - Hgb currently stable in 7.5-8 range  - folate and B12 with good stores
H/H 8.7/27.9 in ED  - Chronic anemia with baseline Hgb ~8-9 per pt's daughter; though pt states last Hgb check in 03/2023 was 9.5  - continue home iron supplement   - No signs or symptoms of overt bleeding aside from bruising from fall  - Monitor CBC daily - Hgb was 7.1 yesterday morning -- now plateauing ~7.5   - folate and B12 with good stores; ferritin of 1400   - checked CT Abd/P to r/out occult hematoma and there was no hematoma  - Hgb now down to 7.2 and pt with signs of symptomatic anemia -- suspect acute event / hospitalization is exacerbating pt's anemia of chronic disease.   - discussed risks/benefits of PRBC transfusion with pt and she would like to have 1un PRBC -- transfused today  - monitor CBC

## 2023-05-30 NOTE — DISCHARGE NOTE NURSING/CASE MANAGEMENT/SOCIAL WORK - NSDCPEFALRISK_GEN_ALL_CORE
For information on Fall & Injury Prevention, visit: [email or web address] OR  [email or web address] OR  https://www.cdc.gov/steadi/patient.html

## 2023-05-30 NOTE — PROGRESS NOTE ADULT - TIME BILLING
Note written by attending, see above.  Time spent: 50min. More than 50% of the visit was spent counseling the patient on medical condition - right-sided superior and inferior pubic rami fractures, right sacral ala fracture, now with suspected right UE cellulitis, Doppler US of LEs reason for test and negative result, anemia, r/out hematoma with CT, symptomatic anemia, 1un PRBC.    Disp: d/c to [facility] tomorrow

## 2023-05-30 NOTE — PROGRESS NOTE ADULT - PROBLEM SELECTOR PLAN 2
- WBC 20.90 on admission was suspected to be reactive from stress response from fall and fractures  - WBC downtrended to ~16, but had fever overnight and signs of early cellulitis at right elbow where pt had a skin laceration that has been healing.   - ID (TREV Juarez), recs appreciated   - will treat with Ancef for suspected cellulitis and f/up cultures sent overnight when pt had the fever  - also checked Doppler LEs to r/out acute DVT to have caused the low grade fever -- Doppler negative for DVT  - leukocytosis improved, but plateauing ~12 now --- as per pt her WBC was ~11.5 on routine outpt bloodwork (suspect BM disorder leading to the mild leukocytosis and chronic anemia -- pt states there is a hematologist she plans to f/up with for w/up as an outpatient)  - given pt's symptoms including another bout of sweats and had some decrease in H&H since start of admission, checked CT Abd/P to eval for any sign of intra-abdominal hematoma or other sign of infection -- CT without hematoma or signs of active infections.   - BCx NGTD, UA negative, UCx contaminated sample with >3organisms  - CXR clear   - had another UA today for occasional dysuria and UA was negative   - will transition to Keflex on discharge

## 2023-05-30 NOTE — PROGRESS NOTE ADULT - PROBLEM SELECTOR PROBLEM 1
Fracture of pubic ramus

## 2023-05-30 NOTE — PROGRESS NOTE ADULT - PROBLEM SELECTOR PLAN 1
Presents after mechanical fall. Possible head strike.  - CT Bony pelvis showin. Acute nondisplaced fractures of the right superior and inferior pubic ramus. 2. Acute comminuted, minimally depressed fracture of the right sacral ala.  - CT Head/Cervical Spine: No intracranial bleed, mass effect or depressed skull fracture. No acute fracture or acute subluxation  - Pain management: Tylenol 650 for mild pain, change to percocet 5/325mg for moderate pain and c/w morphine 2 mg IV for severe pain  - PT/OT consulted, rec TENISHA  - Ortho consulted, rec non-surgical management - WBAT  - also had xrays of right shoulder and wrist/hand which did not have fractures
Presents after mechanical fall. Possible head strike.  - CT Bony pelvis showin. Acute nondisplaced fractures of the right superior and inferior pubic ramus. 2. Acute comminuted, minimally depressed fracture of the right sacral ala.  - CT Head/Cervical Spine: No intracranial bleed, mass effect or depressed skull fracture. No acute fracture or acute subluxation  - Pain management: Tylenol 650 for mild pain, change to percocet 5/325mg for moderate pain and c/w morphine 2 mg IV for severe pain  - pain is improving per pt  - PT/OT consulted, rec TENISHA -- SW working with pt's family on TENISHA placement  - Ortho consulted, rec non-surgical management - WBAT  - also had xrays of right shoulder and wrist/hand which did not have fractures
Presents after mechanical fall. Possible head strike.  - CT Bony pelvis showin. Acute nondisplaced fractures of the right superior and inferior pubic ramus. 2. Acute comminuted, minimally depressed fracture of the right sacral ala.  - CT Head/Cervical Spine: No intracranial bleed, mass effect or depressed skull fracture. No acute fracture or acute subluxation  - Pain management: Tylenol 650 for mild pain, change to percocet 5/325mg for moderate pain and c/w morphine 2 mg IV for severe pain  - PT/OT consulted, rec TENISHA -- SW working with pt's family on TENISHA placement  - Ortho consulted, rec non-surgical management - WBAT  - also had xrays of right shoulder and wrist/hand which did not have fractures

## 2023-05-30 NOTE — PROGRESS NOTE ADULT - PROBLEM SELECTOR PLAN 5
Chronic  - Continue home lisinopril and metoprolol with hold parameters   - monitor hemodynamics

## 2023-05-31 LAB
ANION GAP SERPL CALC-SCNC: 4 MMOL/L — LOW (ref 5–17)
BUN SERPL-MCNC: 28 MG/DL — HIGH (ref 7–23)
CALCIUM SERPL-MCNC: 9.1 MG/DL — SIGNIFICANT CHANGE UP (ref 8.5–10.1)
CHLORIDE SERPL-SCNC: 103 MMOL/L — SIGNIFICANT CHANGE UP (ref 96–108)
CO2 SERPL-SCNC: 27 MMOL/L — SIGNIFICANT CHANGE UP (ref 22–31)
CREAT SERPL-MCNC: 0.75 MG/DL — SIGNIFICANT CHANGE UP (ref 0.5–1.3)
EGFR: 87 ML/MIN/1.73M2 — SIGNIFICANT CHANGE UP
GLUCOSE SERPL-MCNC: 101 MG/DL — HIGH (ref 70–99)
HCT VFR BLD CALC: 26.2 % — LOW (ref 34.5–45)
HGB BLD-MCNC: 8.2 G/DL — LOW (ref 11.5–15.5)
MCHC RBC-ENTMCNC: 28.2 PG — SIGNIFICANT CHANGE UP (ref 27–34)
MCHC RBC-ENTMCNC: 31.3 GM/DL — LOW (ref 32–36)
MCV RBC AUTO: 90 FL — SIGNIFICANT CHANGE UP (ref 80–100)
NRBC # BLD: 0 /100 WBCS — SIGNIFICANT CHANGE UP (ref 0–0)
PLATELET # BLD AUTO: 187 K/UL — SIGNIFICANT CHANGE UP (ref 150–400)
POTASSIUM SERPL-MCNC: 4.2 MMOL/L — SIGNIFICANT CHANGE UP (ref 3.5–5.3)
POTASSIUM SERPL-SCNC: 4.2 MMOL/L — SIGNIFICANT CHANGE UP (ref 3.5–5.3)
RBC # BLD: 2.91 M/UL — LOW (ref 3.8–5.2)
RBC # FLD: 17.3 % — HIGH (ref 10.3–14.5)
SODIUM SERPL-SCNC: 134 MMOL/L — LOW (ref 135–145)
WBC # BLD: 13.5 K/UL — HIGH (ref 3.8–10.5)
WBC # FLD AUTO: 13.5 K/UL — HIGH (ref 3.8–10.5)

## 2023-05-31 PROCEDURE — 93306 TTE W/DOPPLER COMPLETE: CPT | Mod: 26

## 2023-05-31 PROCEDURE — 99232 SBSQ HOSP IP/OBS MODERATE 35: CPT

## 2023-05-31 RX ADMIN — Medication 25 MILLIGRAM(S): at 05:05

## 2023-05-31 RX ADMIN — Medication 250 MILLIGRAM(S): at 17:15

## 2023-05-31 RX ADMIN — LISINOPRIL 10 MILLIGRAM(S): 2.5 TABLET ORAL at 05:05

## 2023-05-31 RX ADMIN — Medication 100 MILLIGRAM(S): at 13:37

## 2023-05-31 RX ADMIN — Medication 3 MILLIGRAM(S): at 21:40

## 2023-05-31 RX ADMIN — Medication 100 MILLIGRAM(S): at 05:05

## 2023-05-31 RX ADMIN — Medication 100 MILLIGRAM(S): at 21:40

## 2023-05-31 RX ADMIN — ENOXAPARIN SODIUM 40 MILLIGRAM(S): 100 INJECTION SUBCUTANEOUS at 17:15

## 2023-05-31 RX ADMIN — Medication 250 MILLIGRAM(S): at 05:05

## 2023-05-31 RX ADMIN — Medication 325 MILLIGRAM(S): at 12:23

## 2023-05-31 NOTE — CONSULT NOTE ADULT - ASSESSMENT
[ASSESSMENT and  PLAN]  Anemia of chronic disease  s/p fall  Pelvic rami fractures    Anemia since at least 08/2022, after review of [facility] labs on pt portal. Hgb baseline 9.0 to 10.0 g/dL      RECOMMENDATIONS  Transfuse PRBC as clinically indicated.   Transfuse PRBC if Hgb <7.0 or if symptomatic.   Follow CBC    Check Anemia studies.     DVT Prophyalxis    Thank you for consulting us.     Discussed plan of care with patient and or family in detail.   They expressed understanding of the treatment plan.   Risks, benefits and alternatives discussed in detail.   Opportunity given for questions and discussion.   Any questions or concerns all addressed and answered to their satisfaction, and in lay terms.     Discussed with  xxxxxxx.    > xxxxxx minutes spent in direct patient care, examining and counseling patient,  reviewing  the notes, lab data/ imaging , discussion with multidisciplinary team.      [ASSESSMENT and  PLAN]  D63.8         Anemia of chronic disease  D62            Anemia due to hemorrhage  W18.30XA   s/p fall from standing  S32.89XA    Pelvic rami fractures    69yo Citizen of [place] F with hx of anemia prior to admission. Has stable followup with PMD  Anemia since at least 08/2022, after review of [facility] labs on pt portal. Hgb baseline 9.0 to 10.0 g/dL  Likely Hgb slight lower in addition due to fall and possibl slight bleed form fx  Anemia studies without treatable deficiency.   Ferritin adequate elevated due to Fx, Ferritin 1401. Unlikely deficient; but cannot conclusively determine wth fx  B12 1979 replete  Folate >20 replete    RECOMMENDATIONS    Transfuse PRBC as clinically indicated.   Transfuse PRBC if Hgb <7.0 or if symptomatic.   Follow CBC    DVT Prophylaxis  Lovenox SQ    Thank you for consulting us.     Discussed plan of care with patient and or family in detail.   They expressed understanding of the treatment plan.   Risks, benefits and alternatives discussed in detail.   Opportunity given for questions and discussion.   Any questions or concerns all addressed and answered to their satisfaction, and in lay terms.     Thank you for consulting us.     No additional recommendations at current time.   Will sign off on case for now.     Can followup outpt non-urgently in 6-8wks post DC for further eval.   Recommend follow with PMD as per usual; where prior records of pt anemia are known  Please call, or re-consult if needed.

## 2023-05-31 NOTE — DIETITIAN INITIAL EVALUATION ADULT - PROBLEM SELECTOR PLAN 4
, likely 2/2 being on ground for prolonged period of time  - S/p 1L NS bolus in ED  - continue NS 75cc/hr

## 2023-05-31 NOTE — DIETITIAN INITIAL EVALUATION ADULT - CALCULATED FROM (ML/KG)
62 y/o M with no significant PMH and a recent diagnosis of COVID on 03/05. Patient presented to [facility] from Urgent Care on 03/08 with complaints of a fever, weakness, SOB, and was found to have worsening hypoxia requiring HFNC. Patient completed RDV (3/8- 3/12) and Decadron (3/8 - 3/17). Course c/b newly diagnosed T2DM with a HbA1c 12.4 and a right lower lobe medial segmental pulmonary embolism on CTA on 3/12 currently on full dose Lovenox. RRT was called 3/17 due to increased work of breathing with an SpO2 70/80's while on HFNC and Nonrebreather mask. Patient intubated 3/17 and transferred to [facility] for further assessment and management.     NEUROLOGY:  Sedation Regimen for Ventilator Synchrony   - Propofol 20, Ketamine 1.5, Fentanyl 3  - Neurologic checks as per ICU protocol.     PULMONARY:  Acute Hypoxemic RF req intubation 3/17  - Vol AC: 350/30/80%/8  - P/F 117, plateau 27      CARDIOLOGY:  Hypotension, likely 2/2 sedation   - Levo/Vaso, wean as tolerated for MAP > 65   - Received 500ml bolus for low UO    GASTROINTESTINAL:  - Glucerna TF @ goal 40ml/hr  - Continue PPI   - Bowel regimen with Senna and Miralax.   - No BM add lactulose x1    RENAL/:  - Strict I&Os. Coates catheter in place.     INFECTIOUS DISEASE:  COVID   - Completed Redesivir for 7 days (3/8- 3/12) and Decadron for 10 days (3/8 - 3/17)  - Continue Empiric Zosyn for 7 Days   - BCx 3/16 NGTD   - COVID-19 isolation precautions.     HEMATOLOGY:  RLL PE   - Continue Lovenox 70mg BID     ENDOCRINE  Newly Dx Uncontrolled DM2  - HbA1c 12.4  - was on Lantus 30/ premeal 15 on the floor   - c/w NPH 16u Q6hr, and mod ISS q6h while on TF         Full Code  Lines: ANNALISE TLC 3/17, R Derek Bryant 3/17       60 y/o M with no significant PMH and a recent diagnosis of COVID on 03/05. Patient presented to [facility] from Urgent Care on 03/08 with complaints of a fever, weakness, SOB, and was found to have worsening hypoxia requiring HFNC. Patient completed RDV (3/8- 3/12) and Decadron (3/8 - 3/17). Course c/b newly diagnosed T2DM with a HbA1c 12.4 and a right lower lobe medial segmental pulmonary embolism on CTA on 3/12 currently on full dose Lovenox. RRT was called 3/17 due to increased work of breathing with an SpO2 70/80's while on HFNC and Nonrebreather mask. Patient intubated 3/17 and transferred to COVID ICU for further assessment and management.     NEUROLOGY:  Sedation Regimen for Ventilator Synchrony   - Propofol 20, Ketamine 1.5, Fentanyl 3  - start nimbex drip if resp not improved plan for prone position  - Neurologic checks as per ICU protocol.     PULMONARY:  Acute Hypoxemic RF req intubation 3/17  - Vol AC: Dec 320/inc 34/80%/8  - P/F 117, plateau 27  - Paralyze with nimbex, repeat ABG post changes if not improved will plan for prone position      CARDIOLOGY:  Hypotension, likely 2/2 sedation   - Bedside FOCUS exam noted with collapse IVC, bolus with 1 L LR and repeat FOCUS later today  - Levo/Vaso, wean as tolerated for MAP > 65   - Received 500ml bolus for low UO    GASTROINTESTINAL:  - Glucerna TF @ goal 40ml/hr  - Continue PPI   - Bowel regimen with Senna and Miralax.   - No BM add lactulose x1    RENAL/:  - Strict I&Os. Coates catheter in place.     INFECTIOUS DISEASE:  COVID   - Completed Redesivir for 7 days (3/8- 3/12) and Decadron for 10 days (3/8 - 3/17)  - Continue Empiric Zosyn for 7 Days   - BCx 3/16 NGTD   - COVID-19 isolation precautions.     HEMATOLOGY:  RLL PE   - Continue Lovenox 70mg BID     ENDOCRINE  Newly Dx Uncontrolled DM2  - HbA1c 12.4  - was on Lantus 30/ premeal 15 on the floor   - c/w NPH 16u Q6hr, and mod ISS q6h while on TF         Full Code  Lines: RIJ TLC 3/17, R Rad Manny 3/17       [id number]

## 2023-05-31 NOTE — DIETITIAN INITIAL EVALUATION ADULT - PERTINENT MEDS FT
MEDICATIONS  (STANDING):  ceFAZolin   IVPB 2000 milliGRAM(s) IV Intermittent every 8 hours  ceFAZolin   IVPB      enoxaparin Injectable 40 milliGRAM(s) SubCutaneous every 24 hours  ferrous    sulfate 325 milliGRAM(s) Oral daily  lisinopril 10 milliGRAM(s) Oral daily  metoprolol succinate ER 25 milliGRAM(s) Oral daily  saccharomyces boulardii 250 milliGRAM(s) Oral two times a day    MEDICATIONS  (PRN):  acetaminophen     Tablet .. 650 milliGRAM(s) Oral every 6 hours PRN Temp greater or equal to 38C (100.4F), Mild Pain (1 - 3)  aluminum hydroxide/magnesium hydroxide/simethicone Suspension 30 milliLiter(s) Oral every 4 hours PRN Dyspepsia  diphenhydrAMINE 25 milliGRAM(s) Oral daily PRN Rash and/or Itching  melatonin 3 milliGRAM(s) Oral at bedtime PRN Insomnia  morphine  - Injectable 2 milliGRAM(s) IV Push every 6 hours PRN Severe Pain (7 - 10)  ondansetron Injectable 4 milliGRAM(s) IV Push every 8 hours PRN Nausea and/or Vomiting  oxycodone    5 mG/acetaminophen 325 mG 1 Tablet(s) Oral every 6 hours PRN Moderate Pain (4 - 6)

## 2023-05-31 NOTE — PROGRESS NOTE ADULT - SUBJECTIVE AND OBJECTIVE BOX
CHIEF COMPLAINT/INTERVAL HISTORY:  Pt. seen and evaluated for pelvic fractures.  Pt. is in no distress.  Denies having significant pain at this time.  Tolerating IV antibiotics.  Noted leukocytosis.      REVIEW OF SYSTEMS:  No fever, CP, SOB, or abdominal pain.     Vital Signs Last 24 Hrs  T(C): 36.7 (31 May 2023 04:46), Max: 37 (30 May 2023 19:54)  T(F): 98.1 (31 May 2023 04:46), Max: 98.6 (30 May 2023 19:54)  HR: 71 (31 May 2023 04:46) (70 - 78)  BP: 120/75 (31 May 2023 04:46) (113/70 - 120/75)  BP(mean): --  RR: 17 (31 May 2023 04:46) (17 - 18)  SpO2: 97% (31 May 2023 04:46) (96% - 97%)    Parameters below as of 31 May 2023 04:46  Patient On (Oxygen Delivery Method): room air        PHYSICAL EXAM:  GENERAL: NAD  HEENT: EOMI, hearing normal, conjunctiva and sclera clear  Chest: CTA bilaterally, no wheezing  CV: S1S2, RRR,   GI: soft, +BS, NT/ND  Musculoskeletal: no LE edema, right elbow healing scab without surrounding erythema or drainage.   Psychiatric: affect nL, mood nL  Skin: warm and dry    LABS:                        8.2    13.50 )-----------( 187      ( 31 May 2023 06:08 )             26.2     05-31    134<L>  |  103  |  28<H>  ----------------------------<  101<H>  4.2   |  27  |  0.75    Ca    9.1      31 May 2023 06:08        Urinalysis Basic - ( 30 May 2023 13:42 )    Color: Yellow / Appearance: Clear / S.020 / pH: x  Gluc: x / Ketone: Negative  / Bili: Negative / Urobili: 0.2   Blood: x / Protein: Negative / Nitrite: Negative   Leuk Esterase: Trace / RBC: 0-2 /HPF / WBC 3-5   Sq Epi: x / Non Sq Epi: x / Bacteria: Few

## 2023-05-31 NOTE — CONSULT NOTE ADULT - CONSULT REASON
Anemia D63.8         Anemia of chronic disease  D62            Anemia due to hemorrhage  W18.30XA   s/p fall from standing  S32.89XA    Pelvic rami fractures

## 2023-05-31 NOTE — CONSULT NOTE ADULT - SUBJECTIVE AND OBJECTIVE BOX
INCOMPLETE NOTE.  Documentation in Progress  PT SEEN AND EVALUATED.   FULL/ADDITIONAL RECOMMENDATIONS TO FOLLOW   ***************************************************************  Patient is a 68y old  Female who presents with a chief complaint of Fall (31 May 2023 11:43)    HPI:  67yo F w/pmhx anemia, HTN, s/p bovine aortic valve replacement, presents to the ED s/p fall. Pt states that around 3:10 she went out to her backyard to plant onions when she tripped over the hose and fell onto the bricks in her backyard. Pt states that she fell on her right side and is unaware if she hit her head. She was unable to get up after the fall and remained on the ground for 2.5 hours. States that she called out for help; however, her  is hard of hearing and has hx of stroke so she is the primary care giver in the house. After a couple of hours, the  went to look for her and found her on the ground in the backyard. He tried to help her up with his cane but was unable. He then called his neighbors for help who then eventually called an ambulance and brought her to the hospital.  ED Course:  Vitals: T 97.8, HR 80, /85, RR 18, spo2 98% on RA  Labs significant for: WBC 20.90 w/left shift, H/H 8.7/27.9,   UA: trace LEC, 3-5 RBC, occasional bacteria  ECG: NSR @ 92 BPM, possible L atrial enlargement, nonspecific ST abnormality, pending official read  CXR: Cardiomegaly, no acute lung pathology on personal read  XR R Hip/Femur/Shoulder: f/u read   CT Head/Cervical Spine: No intracranial bleed, mass effect or depressed skull fracture. No acute fracture or acute subluxation  CT Pelvis: f/u read   Received Morphine 4mg IVP x1, 1L NS bolus in ED (24 May 2023 00:38)    PAST MEDICAL & SURGICAL HISTORY:  Hypertension      Anemia      S/P aortic valve replacement         HEALTH ISSUES - PROBLEM Dx:  Fracture of pubic ramus    Anemia    HTN (hypertension)    Need for prophylactic measure    Elevated CK    Cellulitis          Other specified fracture of right pubis, initial encounter for closed fracture [S32.591A]    Hypertension [I10]    Anemia [D64.9]    S/P aortic valve replacement [Z95.2]    Unable to ambulate [R26.2]      FAMILY HISTORY:  FHx: heart disease (Father)        [SOCIAL HISTORY: ]     smoking:       EtOH:       illicit drugs:       occupation:       marital status:       Other:     [ALLERGIES/INTOLERANCES:]  Allergies    No Known Allergies    Intolerances        [MEDICATIONS]  MEDICATIONS  (STANDING):  ceFAZolin   IVPB 2000 milliGRAM(s) IV Intermittent every 8 hours  ceFAZolin   IVPB      enoxaparin Injectable 40 milliGRAM(s) SubCutaneous every 24 hours  ferrous    sulfate 325 milliGRAM(s) Oral daily  lisinopril 10 milliGRAM(s) Oral daily  metoprolol succinate ER 25 milliGRAM(s) Oral daily  saccharomyces boulardii 250 milliGRAM(s) Oral two times a day    MEDICATIONS  (PRN):  acetaminophen     Tablet .. 650 milliGRAM(s) Oral every 6 hours PRN Temp greater or equal to 38C (100.4F), Mild Pain (1 - 3)  aluminum hydroxide/magnesium hydroxide/simethicone Suspension 30 milliLiter(s) Oral every 4 hours PRN Dyspepsia  diphenhydrAMINE 25 milliGRAM(s) Oral daily PRN Rash and/or Itching  melatonin 3 milliGRAM(s) Oral at bedtime PRN Insomnia  morphine  - Injectable 2 milliGRAM(s) IV Push every 6 hours PRN Severe Pain (7 - 10)  ondansetron Injectable 4 milliGRAM(s) IV Push every 8 hours PRN Nausea and/or Vomiting  oxycodone    5 mG/acetaminophen 325 mG 1 Tablet(s) Oral every 6 hours PRN Moderate Pain (4 - 6)      [REVIEW OF SYSTEMS: ]  CONSTITUTIONAL: normal, no fever, no shakes, no chills   EYES: No eye pain, no visual disturbances, no discharge  ENMT:  no discharge  NECK: No pain, no stiffness  BREASTS: No pain, no masses, no nipple discharge  RESPIRATORY: No cough, no wheezing, no chills, no hemoptysis; No shortness of breath  CARDIOVASCULAR: No chest pain, no palpitations, no dizziness, no leg swelling  GASTROINTESTINAL: No abdominal, no epigastric pain. No nausea, no vomiting, no hematemesis; No diarrhea , no constipation. No melena, no hematochezia.  GENITOURINARY: No dysuria, no frequency, no hematuria, no incontinence  NEUROLOGICAL: No headaches, no memory loss, no loss of strength, no numbness, no tremors  SKIN: No itching, no burning, no rashes, no lesions   LYMPH NODES: No enlarged glands  ENDOCRINE: No heat or cold intolerance; No hair loss  MUSCULOSKELETAL: No joint pain or swelling; No muscle, no back, no extremity pain  PSYCHIATRIC: No depression, no anxiety, no mood swings, no difficulty sleeping  HEME/LYMPH: No easy bruising, no bleeding gums    [VITALS SIGNS 24hrs]  Vital Signs Last 24 Hrs  T(C): 37.1 (31 May 2023 11:37), Max: 37.1 (31 May 2023 11:37)  T(F): 98.8 (31 May 2023 11:37), Max: 98.8 (31 May 2023 11:37)  HR: 69 (31 May 2023 11:37) (69 - 78)  BP: 92/59 (31 May 2023 11:37) (92/59 - 120/75)  BP(mean): --  RR: 17 (31 May 2023 11:37) (17 - 18)  SpO2: 97% (31 May 2023 11:37) (96% - 97%)    Parameters below as of 31 May 2023 11:37  Patient On (Oxygen Delivery Method): room air      Daily     Daily     I&O's Summary    30 May 2023 07:01  -  31 May 2023 07:00  --------------------------------------------------------  IN: 0 mL / OUT: 1000 mL / NET: -1000 mL        [PHYSICAL EXAM]  GEN:   HEENT: normocephalic and atraumatic. EOMI. PERRL.    NECK: Supple.  No lymphadenopathy   LUNGS: Clear to auscultation.  HEART: S1S2 Regular rate and rhythm, no MRG  ABDOMEN: Soft, nontender, and nondistended.  Positive bowel sounds.    : No CVA tenderness  EXTREMITIES: Without edema.  NEUROLOGIC: grossly intact.  PSYCHIATRIC: Appropriate affect .  SKIN: No rash     [LABS: ]                        8.2    13.50 )-----------( 187      ( 31 May 2023 06:08 )             26.2     CBC Full  -  ( 31 May 2023 06:08 )  WBC Count : 13.50 K/uL  RBC Count : 2.91 M/uL  Hemoglobin : 8.2 g/dL  Hematocrit : 26.2 %  Platelet Count - Automated : 187 K/uL  Mean Cell Volume : 90.0 fl  Mean Cell Hemoglobin : 28.2 pg  Mean Cell Hemoglobin Concentration : 31.3 gm/dL  Auto Neutrophil # : x  Auto Lymphocyte # : x  Auto Monocyte # : x  Auto Eosinophil # : x  Auto Basophil # : x  Auto Neutrophil % : x  Auto Lymphocyte % : x  Auto Monocyte % : x  Auto Eosinophil % : x  Auto Basophil % : x        134<L>  |  103  |  28<H>  ----------------------------<  101<H>  4.2   |  27  |  0.75    Ca    9.1      31 May 2023 06:08              Urinalysis Basic - ( 30 May 2023 13:42 )    Color: Yellow / Appearance: Clear / S.020 / pH: x  Gluc: x / Ketone: Negative  / Bili: Negative / Urobili: 0.2   Blood: x / Protein: Negative / Nitrite: Negative   Leuk Esterase: Trace / RBC: 0-2 /HPF / WBC 3-5   Sq Epi: x / Non Sq Epi: x / Bacteria: Few          CBC TREND (5 Days)  WBC Count: 13.50 K/uL ( @ 06:08)  WBC Count: 12.75 K/uL ( @ 05:35)  WBC Count: 12.84 K/uL ( @ 06:35)    Hemoglobin: 8.2 g/dL ( @ 06:08)  Hemoglobin: 8.4 g/dL ( @ 05:35)  Hemoglobin: 7.2 g/dL ( @ 06:35)    Hematocrit: 26.2 % ( @ 06:08)  Hematocrit: 27.5 % ( @ 05:35)  Hematocrit: 23.6 % ( @ 06:35)    Platelet Count - Automated: 187 K/uL ( @ 06:08)  Platelet Count - Automated: 192 K/uL ( @ 05:35)  Platelet Count - Automated: 173 K/uL ( @ 06:35)    Reticulocyte Percent: 2.2 % ( @ 06:35)  Reticulocyte Percent: 2.5 % ( @ 05:00)      Ferritin, Serum: 1401 ng/mL ( @ 05:00)    Iron Total, Serum: 39 ug/dL ( @ 05:00)     Vitamin B12, Serum: 1979 pg/mL ( @ 05:00)     Folate, Serum: >20.0 ng/mL ( @ 05:00)           [MICROBIOLOGY /  VIROLOGY:]  COVID-19 PCR: NotDetec (27 May 2023 10:24)        [PATHOLOGY]       [RADIOLOGY & ADDITIONAL STUDIES:]     ACC: [id number] EXAM:  CT ABDOMEN AND PELVIS   ORDERED BY: TASIA WANG   PROCEDURE DATE:  2023    INTERPRETATION:  CLINICAL INFORMATION: Trauma. Pelvic and sacral fracture. Evaluate for hematoma.  COMPARISON: None.  FINDINGS:    LOWER CHEST: Status post TAPVR.  LIVER: Within normal limits.  BILE DUCTS: Normal caliber.  GALLBLADDER: Gallstones without focal inflammation.  SPLEEN: Within normal limits.  PANCREAS: Within normal limits.  ADRENALS: Within normal limits.  KIDNEYS/URETERS: Within normal limits.  BLADDER: Within normal limits.  REPRODUCTIVE ORGANS: Grossly unremarkable.  BOWEL: No bowel obstruction. Appendix within normal limits.  PERITONEUM: Presacral soft tissue stranding. No discrete hematoma. No ascites. No evidence for retroperitoneal hematoma.  VESSELS: Minimal vascular calcifications. The left external iliac artery stent.  RETROPERITONEUM/LYMPH NODES: No lymphadenopathy.  ABDOMINAL WALL: Mild subcutaneous soft tissue in the right lateral thigh which may represent minimal hematoma following trauma. Foci of air and soft tissue in the left anterior abdominal wall. Has patient received   subcutaneous injections?  BONES: Fracture seen in the right inferiorpubic ramus, and the right side of the sacrum. Question of buckling in the right superior pubic ramus. Fracture in this region cannot entirely be excluded.    IMPRESSION:  Pelvic fractures as above. Mild soft tissue stranding in the presacral region and in the subcutaneous tissue of the right lateral thigh. No discrete hematoma is identified. No evidence for intra-abdominal injury   on this noncontrast CT scan.  --- End of Report ---  RONNIE JACOBS MD; Attending Radiologist        ACC: [id number] EXAM:  US DPLX LWR EXT VEINS COMPL BI   ORDERED BY: TASIA WANG   PROCEDURE DATE:  2023    INTERPRETATION:  CLINICAL INFORMATION: Traumatic fall with pubic rami fractures and sacral fracture. Fever. Swelling.  COMPARISON: None available.  TECHNIQUE: Duplex sonography of the BILATERAL LOWER extremity veins with color and spectral Doppler, with and without compression.  FINDINGS:    RIGHT:  Normal compressibility of the RIGHT common femoral, femoral and popliteal veins.  Doppler examination shows normal spontaneous and phasic flow.  No RIGHT calf vein thrombosis is detected.    LEFT:  Normal compressibility of the LEFT common femoral, femoral and popliteal veins.  Doppler examination shows normal spontaneous and phasic flow.  No LEFT calf vein thrombosis is detected.    IMPRESSION:  No evidence of deep venous thrombosis in either lower extremity.  --- End of Report ---  LOR CARL MD; Attending Radiologist        ACC: [id number] EXAM:  CT 3D RECONSTRUCT WO [facility]   ORDERED BY: JEFFERY MOSLEY   ACC: [id number] EXAM:  CT PELVIS BONY ONLY   ORDERED BY: JEFFERY MOSLEY   PROCEDURE DATE:  2023    INTERPRETATION:  CLINICAL INFORMATION: Status postfall.  COMPARISON: No similar prior studies are available for comparison.  FINDINGS:  Bone: An acute comminuted, nondisplaced fracture is seen at the junction of the right superior pubic ramus and acetabulum. An acute nondisplaced fracture of the right inferior pubic ramus is noted. An acute comminuted, minimally depressed fracture of the right sacral ala is seen. No additional fracture or dislocation is demonstrated. Degenerative changes of the visualized lower lumbar spine and sacroiliac joints are noted.    Softtissues: Mild subcutaneous inflammatory change is seen in the lateral right hip likely posttraumatic in nature. The right abductor joan muscle is mildly enlarged possibly due to muscle strain. A left   common iliac artery stent is noted. A tinyfat-containing umbilical hernia is seen.    IMPRESSION:  1. Acute nondisplaced fractures of the right superior and inferior pubic ramus.  2. Acute comminuted, minimally depressed fracture of the right sacral ala.  --- End of Report ---  GALI DAS MD; Attending Radiologist  This document has been electronically signed. May 24 2023  1:29AM        ACC: [id number] EXAM:  CT BRAIN   ORDERED BY: JEFFERY MOSLEY   ACC: [id number] EXAM:  CT CERVICAL SPINE   ORDERED BY: JEFFERY MOSLEY   PROCEDURE DATE:  2023    INTERPRETATION:  CLINICAL INFORMATION:  sp fall, R hip pain, hit head / neck  COMPARISON STUDY: None    FINDINGS:  CT HEAD: The ventricles and cortical sulci are withinnormal limits.   There is no acute intracranial bleed, extra-axial fluid collection, mass effect, midline shift, hydrocephalus, acute territorial infarct or depressed skull fracture evident. The imaged orbits and mastoid air cells   are grossly unremarkable. There is calcific atherosclerosis of bilateral cavernous ICAs. Polypoid thickening at the base of the left maxillary sinus.    CT CERVICAL SPINE: There is no acute fracture deformity or acute subluxation. Vertebral body heights and alignment are maintained. Mild degenerative changes of the cervical spine are seen. There is no   significant prevertebral soft tissue thickening demonstrated. Mild straightening of the normal cervical lordosis. The visualized lung apices are clear. Suggestion of a small right thyroid nodule, for which further   evaluation with thyroid sonogram can be obtained on a nonemergent basis.    IMPRESSION:  CT HEAD:  No intracranial bleed, mass effect or depressed skull fracture.  CT CERVICAL SPINE: No acute fracture or acute subluxation  --- End of Report ---  JOSUE MANUEL MD; Attending Radiologist  This document has been electronically signed. May 24 2023  1:02AM            ACC: [id number] EXAM:  XR CHEST AP OR PA 1V   ORDERED BY: JEFFERY MOSLEY   ACC: [id number] EXAM:  XR FEMUR 2 VIEWS RT   ORDERED BY: JEFFERY MOSLEY   ACC: [id number] EXAM:  XR HIP WITH PELV 2-3V RT   ORDERED BY: JEFFERY MOSLEY   ACC: [id number] EXAM:  XR SHOULDER COMP MIN 2V RT   ORDERED BY: JEFFERY MOSLEY   ACC: [id number] EXAM:  XR PELVIS COMPLETE MIN 3 VIEWS   ORDERED BY: JOSEPH DUFF   PROCEDURE DATE:  2023    INTERPRETATION:  Right hip with pelvis, right femur, right shoulder, pelvis, and chest. Patient had trauma.  Right hip with pelvis. 3 views.  Hips are relatively free of degeneration.  Degenerative loss of disc height on the right at L4-5 noted.  There is a fracture of the lateral right superior pubic ramus. Inferior right pubic ramus fractures better seen on CAT scan.  Right femur. 2 views of the lower femur shows advanced right knee degeneration.  Right shoulder. 3 views. Mild degeneration of the joint. No fracture.  AP chest on May 23, 2023 10:38 PM.  COMPARISON: None available.  Heart magnified by technique. TAVR aortic valve noted.  Lungs are clear. No fracture.  Pelvis. Inlet and outlet views. 4 images.  Lateral right superior pubic ramus fracture again noted. The inferior pubic ramus fractures difficult to appreciate on plain films.  IMPRESSION: Low right pelvic fractures best seen on CAT scan. No acute chest finding.  --- End of Report ---  SANGITA CRAWFORD MD; Attending Radiologist   Patient is a 68y old  Female who presents with a chief complaint of Fall (31 May 2023 11:43)    HPI:  69yo F w/pmhx anemia, HTN, s/p bovine aortic valve replacement, presents to the ED s/p fall. Pt states that around 3:10 she went out to her backyard to plant onions when she tripped over the hose and fell onto the bricks in her backyard. Pt states that she fell on her right side and is unaware if she hit her head. She was unable to get up after the fall and remained on the ground for 2.5 hours. States that she called out for help; however, her  is hard of hearing and has hx of stroke so she is the primary care giver in the house. After a couple of hours, the  went to look for her and found her on the ground in the backyard. He tried to help her up with his cane but was unable. He then called his neighbors for help who then eventually called an ambulance and brought her to the hospital.  ED Course:  Vitals: T 97.8, HR 80, /85, RR 18, spo2 98% on RA  Labs significant for: WBC 20.90 w/left shift, H/H 8.7/27.9,   UA: trace LEC, 3-5 RBC, occasional bacteria  ECG: NSR @ 92 BPM, possible L atrial enlargement, nonspecific ST abnormality, pending official read  CXR: Cardiomegaly, no acute lung pathology on personal read  XR R Hip/Femur/Shoulder: f/u read   CT Head/Cervical Spine: No intracranial bleed, mass effect or depressed skull fracture. No acute fracture or acute subluxation  CT Pelvis: f/u read   Received Morphine 4mg IVP x1, 1L NS bolus in ED (24 May 2023 00:38)    PAST MEDICAL & SURGICAL HISTORY:  Hypertension  Anemia  S/P aortic valve replacement       HEALTH ISSUES - PROBLEM Dx:  Fracture of pubic ramus  Anemia  HTN (hypertension)  Need for prophylactic measure  Elevated CK  Cellulitis    Other specified fracture of right pubis, initial encounter for closed fracture [S32.591A]  Hypertension [I10]  Anemia [D64.9]  S/P aortic valve replacement [Z95.2]  Unable to ambulate [R26.2]    FAMILY HISTORY:  FHx: heart disease (Father)    [SOCIAL HISTORY: ]     smoking:  non-smoker     EtOH:  no EtOH     illicit drugs:  no drugs     occupation:  homemaker     marital status:  ,  with CVA; disabled     Other: 1 son, 1 dtr. [facility]. [place]ame to [place] ~ 32[place]. Had traveled to various countried prior to arrival. no know exposure to petrochemicals. No jobs with chemical exposures.       [ALLERGIES/INTOLERANCES:]  Allergies      No Known Allergies  Intolerances        [MEDICATIONS]  MEDICATIONS  (STANDING):  ceFAZolin   IVPB 2000 milliGRAM(s) IV Intermittent every 8 hours  ceFAZolin   IVPB      enoxaparin Injectable 40 milliGRAM(s) SubCutaneous every 24 hours  ferrous    sulfate 325 milliGRAM(s) Oral daily  lisinopril 10 milliGRAM(s) Oral daily  metoprolol succinate ER 25 milliGRAM(s) Oral daily  saccharomyces boulardii 250 milliGRAM(s) Oral two times a day    MEDICATIONS  (PRN):  acetaminophen     Tablet .. 650 milliGRAM(s) Oral every 6 hours PRN Temp greater or equal to 38C (100.4F), Mild Pain (1 - 3)  aluminum hydroxide/magnesium hydroxide/simethicone Suspension 30 milliLiter(s) Oral every 4 hours PRN Dyspepsia  diphenhydrAMINE 25 milliGRAM(s) Oral daily PRN Rash and/or Itching  melatonin 3 milliGRAM(s) Oral at bedtime PRN Insomnia  morphine  - Injectable 2 milliGRAM(s) IV Push every 6 hours PRN Severe Pain (7 - 10)  ondansetron Injectable 4 milliGRAM(s) IV Push every 8 hours PRN Nausea and/or Vomiting  oxycodone    5 mG/acetaminophen 325 mG 1 Tablet(s) Oral every 6 hours PRN Moderate Pain (4 - 6)      [REVIEW OF SYSTEMS: ]  CONSTITUTIONAL: normal, no fever, no shakes, no chills   EYES: No eye pain, no visual disturbances, no discharge  ENMT:  no discharge  NECK: No pain, no stiffness  BREASTS: No pain, no masses, no nipple discharge  RESPIRATORY: No cough, no wheezing, no chills, no hemoptysis; No shortness of breath  CARDIOVASCULAR: No chest pain, no palpitations, no dizziness, no leg swelling  GASTROINTESTINAL: No abdominal, no epigastric pain. No nausea, no vomiting, no hematemesis; No diarrhea , no constipation. No melena, no hematochezia.  GENITOURINARY: No dysuria, no frequency, no hematuria, no incontinence  NEUROLOGICAL: No headaches, no memory loss, no loss of strength, no numbness, no tremors  SKIN: No itching, no burning, no rashes, no lesions   LYMPH NODES: No enlarged glands  ENDOCRINE: No heat or cold intolerance; No hair loss  MUSCULOSKELETAL: No joint pain or swelling; No muscle, no back, no extremity pain  PSYCHIATRIC: No depression, no anxiety, no mood swings, no difficulty sleeping  HEME/LYMPH: No easy bruising, no bleeding gums    [VITALS SIGNS 24hrs]  Vital Signs Last 24 Hrs  T(C): 37.1 (31 May 2023 11:37), Max: 37.1 (31 May 2023 11:37)  T(F): 98.8 (31 May 2023 11:37), Max: 98.8 (31 May 2023 11:37)  HR: 69 (31 May 2023 11:37) (69 - 78)  BP: 92/59 (31 May 2023 11:37) (92/59 - 120/75)  BP(mean): --  RR: 17 (31 May 2023 11:37) (17 - 18)  SpO2: 97% (31 May 2023 11:37) (96% - 97%)    Parameters below as of 31 May 2023 11:37  Patient On (Oxygen Delivery Method): room air      Daily     Daily     I&O's Summary    30 May 2023 07:01  -  31 May 2023 07:00  --------------------------------------------------------  IN: 0 mL / OUT: 1000 mL / NET: -1000 mL        [PHYSICAL EXAM]  GEN: AOx3  HEENT: normocephalic and atraumatic. EOMI. PERRL.    NECK: Supple.  No lymphadenopathy   LUNGS: Clear to auscultation.  HEART: S1S2 Regular rate and rhythm, no MRG  ABDOMEN: Soft, nontender, and nondistended.  Positive bowel sounds.    : No CVA tenderness  EXTREMITIES: Without edema.  NEUROLOGIC: grossly intact.  PSYCHIATRIC: Appropriate affect .  SKIN: No rash     [LABS: ]                        8.2    13.50 )-----------( 187      ( 31 May 2023 06:08 )             26.2     CBC Full  -  ( 31 May 2023 06:08 )  WBC Count : 13.50 K/uL  RBC Count : 2.91 M/uL  Hemoglobin : 8.2 g/dL  Hematocrit : 26.2 %  Platelet Count - Automated : 187 K/uL  Mean Cell Volume : 90.0 fl  Mean Cell Hemoglobin : 28.2 pg  Mean Cell Hemoglobin Concentration : 31.3 gm/dL  Auto Neutrophil # : x  Auto Lymphocyte # : x  Auto Monocyte # : x  Auto Eosinophil # : x  Auto Basophil # : x  Auto Neutrophil % : x  Auto Lymphocyte % : x  Auto Monocyte % : x  Auto Eosinophil % : x  Auto Basophil % : x        134<L>  |  103  |  28<H>  ----------------------------<  101<H>  4.2   |  27  |  0.75    Ca    9.1      31 May 2023 06:08    Urinalysis Basic - ( 30 May 2023 13:42 )  Color: Yellow / Appearance: Clear / S.020 / pH: x  Gluc: x / Ketone: Negative  / Bili: Negative / Urobili: 0.2   Blood: x / Protein: Negative / Nitrite: Negative   Leuk Esterase: Trace / RBC: 0-2 /HPF / WBC 3-5   Sq Epi: x / Non Sq Epi: x / Bacteria: Few      CBC TREND (5 Days)  WBC Count: 13.50 K/uL ( @ 06:08)  WBC Count: 12.75 K/uL ( @ 05:35)  WBC Count: 12.84 K/uL ( @ 06:35)    Hemoglobin: 8.2 g/dL ( @ 06:08)  Hemoglobin: 8.4 g/dL ( @ 05:35)  Hemoglobin: 7.2 g/dL ( @ 06:35)    Hematocrit: 26.2 % ( @ 06:08)  Hematocrit: 27.5 % ( @ 05:35)  Hematocrit: 23.6 % ( @ 06:35)    Platelet Count - Automated: 187 K/uL ( @ 06:08)  Platelet Count - Automated: 192 K/uL ( @ 05:35)  Platelet Count - Automated: 173 K/uL ( @ 06:35)    Reticulocyte Percent: 2.2 % ( @ 06:35)  Reticulocyte Percent: 2.5 % ( @ 05:00)      Ferritin, Serum: 1401 ng/mL ( @ 05:00)    Iron Total, Serum: 39 ug/dL ( @ 05:00)     Vitamin B12, Serum: 1979 pg/mL ( @ 05:00)     Folate, Serum: >20.0 ng/mL ( @ 05:00)           [MICROBIOLOGY /  VIROLOGY:]  COVID-19 PCR: NotDetec (27 May 2023 10:24)        [PATHOLOGY]       [RADIOLOGY & ADDITIONAL STUDIES:]     ACC: [id number] EXAM:  CT ABDOMEN AND PELVIS   ORDERED BY: TASIA WANG   PROCEDURE DATE:  2023    INTERPRETATION:  CLINICAL INFORMATION: Trauma. Pelvic and sacral fracture. Evaluate for hematoma.  COMPARISON: None.  FINDINGS:    LOWER CHEST: Status post TAPVR.  LIVER: Within normal limits.  BILE DUCTS: Normal caliber.  GALLBLADDER: Gallstones without focal inflammation.  SPLEEN: Within normal limits.  PANCREAS: Within normal limits.  ADRENALS: Within normal limits.  KIDNEYS/URETERS: Within normal limits.  BLADDER: Within normal limits.  REPRODUCTIVE ORGANS: Grossly unremarkable.  BOWEL: No bowel obstruction. Appendix within normal limits.  PERITONEUM: Presacral soft tissue stranding. No discrete hematoma. No ascites. No evidence for retroperitoneal hematoma.  VESSELS: Minimal vascular calcifications. The left external iliac artery stent.  RETROPERITONEUM/LYMPH NODES: No lymphadenopathy.  ABDOMINAL WALL: Mild subcutaneous soft tissue in the right lateral thigh which may represent minimal hematoma following trauma. Foci of air and soft tissue in the left anterior abdominal wall. Has patient received   subcutaneous injections?  BONES: Fracture seen in the right inferiorpubic ramus, and the right side of the sacrum. Question of buckling in the right superior pubic ramus. Fracture in this region cannot entirely be excluded.    IMPRESSION:  Pelvic fractures as above. Mild soft tissue stranding in the presacral region and in the subcutaneous tissue of the right lateral thigh. No discrete hematoma is identified. No evidence for intra-abdominal injury   on this noncontrast CT scan.  --- End of Report ---  RONNIE JACOBS MD; Attending Radiologist        ACC: [id number] EXAM:  US DPLX LWR EXT VEINS COMPL BI   ORDERED BY: TASIA JORDEN WANG   PROCEDURE DATE:  2023    INTERPRETATION:  CLINICAL INFORMATION: Traumatic fall with pubic rami fractures and sacral fracture. Fever. Swelling.  COMPARISON: None available.  TECHNIQUE: Duplex sonography of the BILATERAL LOWER extremity veins with color and spectral Doppler, with and without compression.  FINDINGS:    RIGHT:  Normal compressibility of the RIGHT common femoral, femoral and popliteal veins.  Doppler examination shows normal spontaneous and phasic flow.  No RIGHT calf vein thrombosis is detected.    LEFT:  Normal compressibility of the LEFT common femoral, femoral and popliteal veins.  Doppler examination shows normal spontaneous and phasic flow.  No LEFT calf vein thrombosis is detected.    IMPRESSION:  No evidence of deep venous thrombosis in either lower extremity.  --- End of Report ---  LOR CARL MD; Attending Radiologist        ACC: [id number] EXAM:  CT 3D RECONSTRUCT WO [facility]   ORDERED BY: JEFFERY MOSLEY   ACC: [id number] EXAM:  CT PELVIS BONY ONLY   ORDERED BY: JEFFERY MOSLEY   PROCEDURE DATE:  2023    INTERPRETATION:  CLINICAL INFORMATION: Status postfall.  COMPARISON: No similar prior studies are available for comparison.  FINDINGS:  Bone: An acute comminuted, nondisplaced fracture is seen at the junction of the right superior pubic ramus and acetabulum. An acute nondisplaced fracture of the right inferior pubic ramus is noted. An acute comminuted, minimally depressed fracture of the right sacral ala is seen. No additional fracture or dislocation is demonstrated. Degenerative changes of the visualized lower lumbar spine and sacroiliac joints are noted.    Softtissues: Mild subcutaneous inflammatory change is seen in the lateral right hip likely posttraumatic in nature. The right abductor joan muscle is mildly enlarged possibly due to muscle strain. A left   common iliac artery stent is noted. A tinyfat-containing umbilical hernia is seen.    IMPRESSION:  1. Acute nondisplaced fractures of the right superior and inferior pubic ramus.  2. Acute comminuted, minimally depressed fracture of the right sacral ala.  --- End of Report ---  GALI DAS MD; Attending Radiologist  This document has been electronically signed. May 24 2023  1:29AM        ACC: [id number] EXAM:  CT BRAIN   ORDERED BY: JEFFERY MOSLEY   ACC: [id number] EXAM:  CT CERVICAL SPINE   ORDERED BY: JEFFERY MOSLEY   PROCEDURE DATE:  2023    INTERPRETATION:  CLINICAL INFORMATION:  sp fall, R hip pain, hit head / neck  COMPARISON STUDY: None    FINDINGS:  CT HEAD: The ventricles and cortical sulci are withinnormal limits.   There is no acute intracranial bleed, extra-axial fluid collection, mass effect, midline shift, hydrocephalus, acute territorial infarct or depressed skull fracture evident. The imaged orbits and mastoid air cells   are grossly unremarkable. There is calcific atherosclerosis of bilateral cavernous ICAs. Polypoid thickening at the base of the left maxillary sinus.    CT CERVICAL SPINE: There is no acute fracture deformity or acute subluxation. Vertebral body heights and alignment are maintained. Mild degenerative changes of the cervical spine are seen. There is no   significant prevertebral soft tissue thickening demonstrated. Mild straightening of the normal cervical lordosis. The visualized lung apices are clear. Suggestion of a small right thyroid nodule, for which further   evaluation with thyroid sonogram can be obtained on a nonemergent basis.    IMPRESSION:  CT HEAD:  No intracranial bleed, mass effect or depressed skull fracture.  CT CERVICAL SPINE: No acute fracture or acute subluxation  --- End of Report ---  JOSUE MANUEL MD; Attending Radiologist  This document has been electronically signed. May 24 2023  1:02AM            ACC: [id number] EXAM:  XR CHEST AP OR PA 1V   ORDERED BY: JEFFERY MOSLEY   ACC: [id number] EXAM:  XR FEMUR 2 VIEWS RT   ORDERED BY: JEFFERY MOSLEY   ACC: [id number] EXAM:  XR HIP WITH PELV 2-3V RT   ORDERED BY: JEFFERY MOSLEY   ACC: [id number] EXAM:  XR SHOULDER COMP MIN 2V RT   ORDERED BY: JEFFERY MOSLEY   ACC: [id number] EXAM:  XR PELVIS COMPLETE MIN 3 VIEWS   ORDERED BY: JOSEPH DUFF   PROCEDURE DATE:  2023    INTERPRETATION:  Right hip with pelvis, right femur, right shoulder, pelvis, and chest. Patient had trauma.  Right hip with pelvis. 3 views.  Hips are relatively free of degeneration.  Degenerative loss of disc height on the right at L4-5 noted.  There is a fracture of the lateral right superior pubic ramus. Inferior right pubic ramus fractures better seen on CAT scan.  Right femur. 2 views of the lower femur shows advanced right knee degeneration.  Right shoulder. 3 views. Mild degeneration of the joint. No fracture.  AP chest on May 23, 2023 10:38 PM.  COMPARISON: None available.  Heart magnified by technique. TAVR aortic valve noted.  Lungs are clear. No fracture.  Pelvis. Inlet and outlet views. 4 images.  Lateral right superior pubic ramus fracture again noted. The inferior pubic ramus fractures difficult to appreciate on plain films.  IMPRESSION: Low right pelvic fractures best seen on CAT scan. No acute chest finding.  --- End of Report ---  SANGITA CRAWFORD MD; Attending Radiologist

## 2023-05-31 NOTE — DIETITIAN INITIAL EVALUATION ADULT - PROBLEM SELECTOR PLAN 1
Presents after mechanical fall. Possible head strike.  - XR pelvis shows fracture of pubic ramus, follow up official read  - CT Head/Cervical Spine: No intracranial bleed, mass effect or depressed skull fracture. No acute fracture or acute subluxation  - Follow up official reads of XR R Hip/Femur/Shoulder, CT Pelvis  - S/p Morphine 4mg IVP x1, 1L NS bolus in ED  - Pain management: Tylenol 650 for mild pain, morphine 1mg for moderate and morphine 2 mg for severe   - PT/OT consulted, f/u recs  - Ortho consulted, f/u recs, likely non-surgical management

## 2023-05-31 NOTE — DIETITIAN INITIAL EVALUATION ADULT - PROBLEM SELECTOR PLAN 2
H/H 8.7/27.9 in ED  - Chronic  - continue home iron supplement   - No signs or symptoms of overt bleeding  - F/u iron studies  - Monitor CBC daily

## 2023-05-31 NOTE — DIETITIAN INITIAL EVALUATION ADULT - PROBLEM SELECTOR PLAN 5
CONSTITUTIONAL: Well-appearing; well-nourished; in no apparent distress.   EYES: PERRL; EOM intact.   CARDIOVASCULAR: Tachycardia.  Normal S1, S2; no murmurs, rubs, or gallops.   RESPIRATORY: Normal chest excursion with respiration; breath sounds clear and equal bilaterally; no wheezes, rhonchi, or rales.  GI/: Normal bowel sounds; non-distended; non-tender; no palpable organomegaly.   MS: No calf swelling and tenderness.  SKIN: Normal for age and race; warm; dry; good turgor; no apparent lesions or exudate.   NEURO/PSYCH: A & O x 4; grossly unremarkable.  Mild tremors to both hands on extensions.. Chronic  - Continue home lisinopril and metoprolol with hold parameters   - monitor hemodynamics

## 2023-05-31 NOTE — PROGRESS NOTE ADULT - SUBJECTIVE AND OBJECTIVE BOX
[facility], Division of Infectious Diseases  KELTON Brewer Y. Patel, S. Shah, G. [facility]  [phone]    Name: KHADAR MCDONADL  Age: 68y  Gender: Female  MRN: [id number]    Interval History:  Patient seen and examined at bedside this morning  No acute overnight events. Afebrile  No complaints  Notes reviewed    Antibiotics:  ceFAZolin   IVPB      ceFAZolin   IVPB 2000 milliGRAM(s) IV Intermittent every 8 hours      Medications:  acetaminophen     Tablet .. 650 milliGRAM(s) Oral every 6 hours PRN  aluminum hydroxide/magnesium hydroxide/simethicone Suspension 30 milliLiter(s) Oral every 4 hours PRN  ceFAZolin   IVPB 2000 milliGRAM(s) IV Intermittent every 8 hours  ceFAZolin   IVPB      diphenhydrAMINE 25 milliGRAM(s) Oral daily PRN  enoxaparin Injectable 40 milliGRAM(s) SubCutaneous every 24 hours  ferrous    sulfate 325 milliGRAM(s) Oral daily  lisinopril 10 milliGRAM(s) Oral daily  melatonin 3 milliGRAM(s) Oral at bedtime PRN  metoprolol succinate ER 25 milliGRAM(s) Oral daily  morphine  - Injectable 2 milliGRAM(s) IV Push every 6 hours PRN  ondansetron Injectable 4 milliGRAM(s) IV Push every 8 hours PRN  oxycodone    5 mG/acetaminophen 325 mG 1 Tablet(s) Oral every 6 hours PRN  saccharomyces boulardii 250 milliGRAM(s) Oral two times a day      Review of Systems:  Review of systems otherwise negative except as previously noted.    Allergies: No Known Allergies    For details regarding the patient's past medical history, social history, family history, and other miscellaneous elements, please refer the initial infectious diseases consultation and/or the admitting history and physical examination for this admission.    Objective:  Vitals:   T(C): 37.1 (23 @ 11:37), Max: 37.1 (23 @ 11:37)  HR: 69 (23 @ 11:37) (69 - 78)  BP: 92/59 (23 @ 11:37) (92/59 - 120/75)  RR: 17 (23 @ 11:37) (17 - 18)  SpO2: 97% (23 @ 11:37) (96% - 97%)    Physical Examination:  General: nontoxic-appearing, no acute distress  HEENT: NC/AT, EOMI,  Lungs: Clear bilaterally without rales, wheezing or rhonchi  Heart: Regular rate and rhythm. No murmur, rub or gallop.  Abdomen: Soft. Nondistended. Nontender.   Extremities: No cyanosis or clubbing. No edema. R elbow laceration, surrounding erythema resolved  Skin: Warm. Dry. Good turgor.     Laboratory Studies:  CBC:                       8.2    13.50 )-----------( 187      ( 31 May 2023 06:08 )             26.2     CMP:     134<L>  |  103  |  28<H>  ----------------------------<  101<H>  4.2   |  27  |  0.75    Ca    9.1      31 May 2023 06:08        Urinalysis Basic - ( 30 May 2023 13:42 )    Color: Yellow / Appearance: Clear / S.020 / pH: x  Gluc: x / Ketone: Negative  / Bili: Negative / Urobili: 0.2   Blood: x / Protein: Negative / Nitrite: Negative   Leuk Esterase: Trace / RBC: 0-2 /HPF / WBC 3-5   Sq Epi: x / Non Sq Epi: x / Bacteria: Few        Microbiology: reviewed    Culture - Urine (collected 23 @ 13:00)  Source: Clean Catch Clean Catch (Midstream)  Final Report (23 @ 11:31):    >=3 organisms. Probable collection contamination.    Culture - Blood (collected 23 @ 21:45)  Source: .Blood Blood-Peripheral  Final Report (23 @ 01:01):    No Growth Final    Culture - Blood (collected 23 @ 21:40)  Source: .Blood Blood-Peripheral  Final Report (23 @ 01:01):    No Growth Final          Radiology: reviewed

## 2023-05-31 NOTE — DIETITIAN INITIAL EVALUATION ADULT - ORAL INTAKE PTA/DIET HISTORY
patient reports with good PO intake . making selections from menu . food preferences noted. at home follows CRISTINE diet three meals per day PTA  patient for TENISHA. take FeSO4 at home   # no food allergies, no problems chewing swallowing   deferred education at this time

## 2023-05-31 NOTE — DIETITIAN INITIAL EVALUATION ADULT - PERTINENT LABORATORY DATA
05-31    134<L>  |  103  |  28<H>  ----------------------------<  101<H>  4.2   |  27  |  0.75    Ca    9.1      31 May 2023 06:08    A1C with Estimated Average Glucose Result: 5.6 % (05-29-23 @ 06:35)

## 2023-05-31 NOTE — DIETITIAN INITIAL EVALUATION ADULT - OTHER INFO
Reason for Admission: Fall  History of Present Illness:   69yo F w/pmhx anemia, HTN, s/p bovine aortic valve replacement, presents to the ED s/p fall. Pt states that around 3:10 she went out to her backyard to plant onions when she tripped over the hose and fell onto the bricks in her backyard. Pt states that she fell on her right side and is unaware if she hit her head. She was unable to get up after the fall and remained on the ground for 2.5 hours.  weight given 168# Ht 61"  5/30 BM  B12 folate and Iron WNL HgbA1c 5.6%

## 2023-06-01 LAB
ANION GAP SERPL CALC-SCNC: 5 MMOL/L — SIGNIFICANT CHANGE UP (ref 5–17)
BASOPHILS # BLD AUTO: 0.13 K/UL — SIGNIFICANT CHANGE UP (ref 0–0.2)
BASOPHILS NFR BLD AUTO: 1 % — SIGNIFICANT CHANGE UP (ref 0–2)
BUN SERPL-MCNC: 35 MG/DL — HIGH (ref 7–23)
CALCIUM SERPL-MCNC: 9 MG/DL — SIGNIFICANT CHANGE UP (ref 8.5–10.1)
CHLORIDE SERPL-SCNC: 106 MMOL/L — SIGNIFICANT CHANGE UP (ref 96–108)
CO2 SERPL-SCNC: 26 MMOL/L — SIGNIFICANT CHANGE UP (ref 22–31)
CREAT SERPL-MCNC: 0.79 MG/DL — SIGNIFICANT CHANGE UP (ref 0.5–1.3)
EGFR: 82 ML/MIN/1.73M2 — SIGNIFICANT CHANGE UP
EOSINOPHIL # BLD AUTO: 1.41 K/UL — HIGH (ref 0–0.5)
EOSINOPHIL NFR BLD AUTO: 11 % — HIGH (ref 0–6)
GLUCOSE SERPL-MCNC: 92 MG/DL — SIGNIFICANT CHANGE UP (ref 70–99)
HCT VFR BLD CALC: 26.9 % — LOW (ref 34.5–45)
HGB BLD-MCNC: 8.3 G/DL — LOW (ref 11.5–15.5)
LYMPHOCYTES # BLD AUTO: 2.83 K/UL — SIGNIFICANT CHANGE UP (ref 1–3.3)
LYMPHOCYTES # BLD AUTO: 22 % — SIGNIFICANT CHANGE UP (ref 13–44)
MAGNESIUM SERPL-MCNC: 2.1 MG/DL — SIGNIFICANT CHANGE UP (ref 1.6–2.6)
MCHC RBC-ENTMCNC: 27.7 PG — SIGNIFICANT CHANGE UP (ref 27–34)
MCHC RBC-ENTMCNC: 30.9 GM/DL — LOW (ref 32–36)
MCV RBC AUTO: 89.7 FL — SIGNIFICANT CHANGE UP (ref 80–100)
MONOCYTES # BLD AUTO: 1.29 K/UL — HIGH (ref 0–0.9)
MONOCYTES NFR BLD AUTO: 10 % — SIGNIFICANT CHANGE UP (ref 2–14)
NEUTROPHILS # BLD AUTO: 6.94 K/UL — SIGNIFICANT CHANGE UP (ref 1.8–7.4)
NEUTROPHILS NFR BLD AUTO: 53 % — SIGNIFICANT CHANGE UP (ref 43–77)
NRBC # BLD: SIGNIFICANT CHANGE UP /100 WBCS (ref 0–0)
PLATELET # BLD AUTO: 204 K/UL — SIGNIFICANT CHANGE UP (ref 150–400)
POTASSIUM SERPL-MCNC: 4.6 MMOL/L — SIGNIFICANT CHANGE UP (ref 3.5–5.3)
POTASSIUM SERPL-SCNC: 4.6 MMOL/L — SIGNIFICANT CHANGE UP (ref 3.5–5.3)
RBC # BLD: 3 M/UL — LOW (ref 3.8–5.2)
RBC # FLD: 17.1 % — HIGH (ref 10.3–14.5)
SODIUM SERPL-SCNC: 137 MMOL/L — SIGNIFICANT CHANGE UP (ref 135–145)
WBC # BLD: 12.85 K/UL — HIGH (ref 3.8–10.5)
WBC # FLD AUTO: 12.85 K/UL — HIGH (ref 3.8–10.5)

## 2023-06-01 PROCEDURE — 99232 SBSQ HOSP IP/OBS MODERATE 35: CPT

## 2023-06-01 RX ORDER — OXYCODONE AND ACETAMINOPHEN 5; 325 MG/1; MG/1
2 TABLET ORAL EVERY 6 HOURS
Refills: 0 | Status: DISCONTINUED | OUTPATIENT
Start: 2023-06-01 | End: 2023-06-02

## 2023-06-01 RX ORDER — OXYCODONE AND ACETAMINOPHEN 5; 325 MG/1; MG/1
1 TABLET ORAL EVERY 6 HOURS
Refills: 0 | Status: DISCONTINUED | OUTPATIENT
Start: 2023-06-01 | End: 2023-06-02

## 2023-06-01 RX ADMIN — Medication 325 MILLIGRAM(S): at 11:44

## 2023-06-01 RX ADMIN — Medication 25 MILLIGRAM(S): at 05:53

## 2023-06-01 RX ADMIN — Medication 250 MILLIGRAM(S): at 17:19

## 2023-06-01 RX ADMIN — Medication 650 MILLIGRAM(S): at 12:45

## 2023-06-01 RX ADMIN — Medication 250 MILLIGRAM(S): at 05:53

## 2023-06-01 RX ADMIN — Medication 650 MILLIGRAM(S): at 11:45

## 2023-06-01 RX ADMIN — Medication 100 MILLIGRAM(S): at 05:52

## 2023-06-01 RX ADMIN — ENOXAPARIN SODIUM 40 MILLIGRAM(S): 100 INJECTION SUBCUTANEOUS at 17:18

## 2023-06-01 RX ADMIN — LISINOPRIL 10 MILLIGRAM(S): 2.5 TABLET ORAL at 05:53

## 2023-06-01 NOTE — PROGRESS NOTE ADULT - REASON FOR ADMISSION
Fall
traumatic fall, right-sided superior and inferior pubic rami fractures, right sacral ala fracture
traumatic fall, right-sided superior and inferior pubic rami fractures, right sacral ala fracture
Fall
traumatic fall, right-sided superior and inferior pubic rami fractures, right sacral ala fracture

## 2023-06-01 NOTE — PROGRESS NOTE ADULT - SUBJECTIVE AND OBJECTIVE BOX
CHIEF COMPLAINT/INTERVAL HISTORY:  Pt. seen and evaluated for pelvic fractures.  Pt. sitting in chair in no distress.  Reports improvement in pelvic pain.      REVIEW OF SYSTEMS:  No fever, CP, SOB, or abdominal pain    Vital Signs Last 24 Hrs  T(C): 36.7 (2023 04:40), Max: 37.1 (31 May 2023 11:37)  T(F): 98.1 (2023 04:40), Max: 98.8 (31 May 2023 11:37)  HR: 77 (2023 04:40) (69 - 77)  BP: 113/70 (2023 04:40) (92/59 - 113/70)  BP(mean): --  RR: 17 (2023 04:40) (17 - 18)  SpO2: 94% (2023 04:40) (93% - 97%)    Parameters below as of 2023 04:40  Patient On (Oxygen Delivery Method): room air        PHYSICAL EXAM:  GENERAL: NAD  HEENT: EOMI, hearing normal, conjunctiva and sclera clear  Chest: CTA bilaterally, no wheezing  CV: S1S2, RRR,   GI: soft, +BS, NT/ND  Musculoskeletal: no edema, right elbow healing scab without surrounding erythema or drainage.   Psychiatric: affect nL, mood nL  Skin: warm and dry    LABS:                        8.3    12.85 )-----------( 204      ( 2023 06:20 )             26.9     06-01    137  |  106  |  35<H>  ----------------------------<  92  4.6   |  26  |  0.79    Ca    9.0      2023 06:20  Mg     2.1     06-01        Urinalysis Basic - ( 30 May 2023 13:42 )    Color: Yellow / Appearance: Clear / S.020 / pH: x  Gluc: x / Ketone: Negative  / Bili: Negative / Urobili: 0.2   Blood: x / Protein: Negative / Nitrite: Negative   Leuk Esterase: Trace / RBC: 0-2 /HPF / WBC 3-5   Sq Epi: x / Non Sq Epi: x / Bacteria: Few

## 2023-06-01 NOTE — PROGRESS NOTE ADULT - SUBJECTIVE AND OBJECTIVE BOX
[facility], Division of Infectious Diseases  KELTON Brewer Y. Patel, S. Shah, G. [facility]  [phone]    Name: KHADAR MCDONALD  Age: 68y  Gender: Female  MRN: [id number]    Interval History:  Patient seen and examined at bedside this morning  No acute overnight events. Afebrile  No complaints  Feeling better overall, still having significant pelvic pain  Notes reviewed    Antibiotics:  ceFAZolin   IVPB      ceFAZolin   IVPB 2000 milliGRAM(s) IV Intermittent every 8 hours      Medications:  acetaminophen     Tablet .. 650 milliGRAM(s) Oral every 6 hours PRN  aluminum hydroxide/magnesium hydroxide/simethicone Suspension 30 milliLiter(s) Oral every 4 hours PRN  ceFAZolin   IVPB 2000 milliGRAM(s) IV Intermittent every 8 hours  ceFAZolin   IVPB      diphenhydrAMINE 25 milliGRAM(s) Oral daily PRN  enoxaparin Injectable 40 milliGRAM(s) SubCutaneous every 24 hours  ferrous    sulfate 325 milliGRAM(s) Oral daily  lisinopril 10 milliGRAM(s) Oral daily  melatonin 3 milliGRAM(s) Oral at bedtime PRN  metoprolol succinate ER 25 milliGRAM(s) Oral daily  ondansetron Injectable 4 milliGRAM(s) IV Push every 8 hours PRN  oxycodone    5 mG/acetaminophen 325 mG 1 Tablet(s) Oral every 6 hours PRN  saccharomyces boulardii 250 milliGRAM(s) Oral two times a day      Review of Systems:  A 10-point review of systems was obtained.   Review of systems otherwise negative except as previously noted.    Allergies: No Known Allergies    For details regarding the patient's past medical history, social history, family history, and other miscellaneous elements, please refer the initial infectious diseases consultation and/or the admitting history and physical examination for this admission.    Objective:  Vitals:   T(C): 36.7 (23 @ 04:40), Max: 37.1 (23 @ 11:37)  HR: 77 (23 @ 04:40) (69 - 77)  BP: 113/70 (23 @ 04:40) (92/59 - 113/70)  RR: 17 (23 @ 04:40) (17 - 18)  SpO2: 94% (23 @ 04:40) (93% - 97%)    Physical Examination:  General: nontoxic-appearing, no acute distress  HEENT: NC/AT, EOMI,  Lungs: Clear bilaterally without rales, wheezing or rhonchi  Heart: Regular rate and rhythm. No murmur, rub or gallop.  Abdomen: Soft. Nondistended. Nontender.   Extremities: No cyanosis or clubbing. No edema. R elbow laceration, surrounding erythema resolved  Skin: Warm. Dry. Good turgor.       Laboratory Studies:  CBC:                       8.3    12.85 )-----------( 204      ( 2023 06:20 )             26.9     CMP:     137  |  106  |  35<H>  ----------------------------<  92  4.6   |  26  |  0.79    Ca    9.0      2023 06:20  Mg     2.1             Urinalysis Basic - ( 30 May 2023 13:42 )    Color: Yellow / Appearance: Clear / S.020 / pH: x  Gluc: x / Ketone: Negative  / Bili: Negative / Urobili: 0.2   Blood: x / Protein: Negative / Nitrite: Negative   Leuk Esterase: Trace / RBC: 0-2 /HPF / WBC 3-5   Sq Epi: x / Non Sq Epi: x / Bacteria: Few        Microbiology: reviewed    Culture - Urine (collected 23 @ 13:00)  Source: Clean Catch Clean Catch (Midstream)  Final Report (23 @ 11:31):    >=3 organisms. Probable collection contamination.    Culture - Blood (collected 23 @ 21:45)  Source: .Blood Blood-Peripheral  Final Report (23 @ 01:01):    No Growth Final    Culture - Blood (collected 23 @ 21:40)  Source: .Blood Blood-Peripheral  Final Report (23 @ 01:01):    No Growth Final          Radiology: reviewed

## 2023-06-01 NOTE — PROGRESS NOTE ADULT - PROVIDER SPECIALTY LIST ADULT
Hospitalist
Infectious Disease
Hospitalist

## 2023-06-01 NOTE — PROGRESS NOTE ADULT - ASSESSMENT
69yo F w/ PMH of anemia, HTN, severe AS s/p bovine TAVR, presents to the ED with right hip pain and inability to ambulate s/p mechanical fall, admitted with right-sided superior and inferior pubic rami fractures, right sacral ala fracture, course c/b suspected right UE cellulitis.      Problem/Plan - 1:  ·  Problem: Fracture of pubic ramus.   ·  Plan: Presents after mechanical fall. Possible head strike.  - CT Bony pelvis showin. Acute nondisplaced fractures of the right superior and inferior pubic ramus. 2. Acute comminuted, minimally depressed fracture of the right sacral ala.  - CT Head/Cervical Spine: No intracranial bleed, mass effect or depressed skull fracture. No acute fracture or acute subluxation  - Pain management: Tylenol 650 for mild pain, percocet 5/325mg 1-2 tabs Q6h PRN for moderate to severe pain  - pain is improving per pt  - PT/OT consulted, rec TENISHA -- SW working with pt's family on TENISHA placement  - Ortho consulted, rec non-surgical management - WBAT  - also had xrays of right shoulder and wrist/hand which did not have fractures.     Problem/Plan - 2:  ·  Problem: Cellulitis.   ·  Plan: - WBC 20.90 on admission was suspected to be reactive from stress response from fall and fractures  - WBC downtrended to ~16, but had fever overnight and signs of early cellulitis at right elbow where pt had a skin laceration that has been healing.   - ID (TREV Juarez), recs appreciated   - completed course of IV Ancef for suspected cellulitis   - also checked Doppler LEs to r/out acute DVT to have caused the low grade fever -- Doppler negative for DVT  - leukocytosis improved, but plateauing ~12 now - likely reactive to pelvic fractures.  Hematology f/u.  - given pt's symptoms including another bout of sweats and had some decrease in H&H since start of admission, checked CT Abd/P to eval for any sign of intra-abdominal hematoma or other sign of infection -- CT without hematoma or signs of active infections.   - BCx NG, UA negative, UCx contaminated sample with >3organisms  - CXR clear   - had another UA today for occasional dysuria and UA was negative      Problem/Plan - 3:  ·  Problem: Anemia.   ·  Plan: H/H 8.7/27.9 in ED  - Chronic anemia with baseline Hgb ~8-9 per pt's daughter; though pt states last Hgb check in 2023 was 9.5  - continue home iron supplement   - No signs or symptoms of overt bleeding aside from bruising from fall  - Monitor CBC daily - Hgb was 7.1 yesterday morning -- now plateauing ~7.5   - folate and B12 with good stores; ferritin of 1400   - checked CT Abd/P to r/out occult hematoma and there was no hematoma  - Hgb now down to 7.2 and pt with signs of symptomatic anemia -- suspect acute event / hospitalization is exacerbating pt's anemia of chronic disease.   - discussed risks/benefits of PRBC transfusion with pt and she would like to have 1un PRBC -- transfused 1un on  with appropriate response to Hgb of 8.4   - monitor CBC  - hematology input appreciated     Problem/Plan - 4:  ·  Problem: Elevated CK.   ·  Plan: , likely mild rhabdomyolysis due to fall and being on ground for extended period of time  - rehydrated and CK levels downtrended.     Problem/Plan - 5:  ·  Problem: HTN (hypertension).   ·  Plan: Chronic  - Continue home lisinopril and metoprolol with hold parameters   - monitor hemodynamics.     Problem/Plan - 6:  ·  Problem: Need for prophylactic measure.   ·  Plan: VTE ppx: c/w lovenox.          
67yo F w/pmhx anemia, HTN, s/p bovine aortic valve replacement, presents to the ED s/p fall. Admitted for pubic rami fx.   ID c/s for fever and persistent leukocytosis    R arm exam consistent w/ possible early cellulitis  - pt w/ sustained laceration to R elbow area, now w/ surrounding erythema and persistent swelling/pain  - imaging reviewed -- pt w/ sustained pubic rami fx  - BCx NGTD  Recommendations:  Clinically improving  Cefazolin completed  Monitor off Abx  Appreciate ortho recs given pubic rami fx--no acute intervention    Pain control, supportive care and additional management per primary team    Stable from ID standpoint  D/c planning per primary team    Infectious Diseases will sign off.   Please call with any questions.   Daniela Juarez M.D.  [facility] Division of Infectious Diseases [phone]  
67yo F w/ PMH of anemia, HTN, severe AS s/p bovine TAVR, presents to the ED with right hip pain and inability to ambulate s/p mechanical fall, admitted with right-sided superior and inferior pubic rami fractures, right sacral ala fracture, course c/b suspected right UE cellulitis.      Problem/Plan - 1:  ·  Problem: Fracture of pubic ramus.   ·  Plan: Presents after mechanical fall. Possible head strike.  - CT Bony pelvis showin. Acute nondisplaced fractures of the right superior and inferior pubic ramus. 2. Acute comminuted, minimally depressed fracture of the right sacral ala.  - CT Head/Cervical Spine: No intracranial bleed, mass effect or depressed skull fracture. No acute fracture or acute subluxation  - Pain management: Tylenol 650 for mild pain, percocet 5/325mg for moderate pain and morphine 2 mg IV for severe pain  - pain is improving per pt  - PT/OT consulted, rec TENISHA -- SW working with pt's family on TENISHA placement  - Ortho consulted, rec non-surgical management - WBAT  - also had xrays of right shoulder and wrist/hand which did not have fractures.     Problem/Plan - 2:  ·  Problem: Cellulitis.   ·  Plan: - WBC 20.90 on admission was suspected to be reactive from stress response from fall and fractures  - WBC downtrended to ~16, but had fever overnight and signs of early cellulitis at right elbow where pt had a skin laceration that has been healing.   - ID (TREV Juarez), recs appreciated   - will treat with Ancef for suspected cellulitis and f/up cultures sent overnight when pt had the fever  - also checked Doppler LEs to r/out acute DVT to have caused the low grade fever -- Doppler negative for DVT  - leukocytosis improved, but plateauing ~12 now --- as per pt her WBC was ~11.5 on routine outpt bloodwork (suspect BM disorder leading to the mild leukocytosis and chronic anemia -- hematology consult requested.  - given pt's symptoms including another bout of sweats and had some decrease in H&H since start of admission, checked CT Abd/P to eval for any sign of intra-abdominal hematoma or other sign of infection -- CT without hematoma or signs of active infections.   - BCx NG, UA negative, UCx contaminated sample with >3organisms  - CXR clear   - had another UA today for occasional dysuria and UA was negative   - will transition to Keflex on discharge.     Problem/Plan - 3:  ·  Problem: Anemia.   ·  Plan: H/H 8.7/27.9 in ED  - Chronic anemia with baseline Hgb ~8-9 per pt's daughter; though pt states last Hgb check in 2023 was 9.5  - continue home iron supplement   - No signs or symptoms of overt bleeding aside from bruising from fall  - Monitor CBC daily - Hgb was 7.1 yesterday morning -- now plateauing ~7.5   - folate and B12 with good stores; ferritin of 1400   - checked CT Abd/P to r/out occult hematoma and there was no hematoma  - Hgb now down to 7.2 and pt with signs of symptomatic anemia -- suspect acute event / hospitalization is exacerbating pt's anemia of chronic disease.   - discussed risks/benefits of PRBC transfusion with pt and she would like to have 1un PRBC -- transfused 1un on  with appropriate response to Hgb of 8.4   - monitor CBC  - hematology consult.     Problem/Plan - 4:  ·  Problem: Elevated CK.   ·  Plan: , likely mild rhabdomyolysis due to fall and being on ground for extended period of time  - rehydrated and CK levels downtrended.     Problem/Plan - 5:  ·  Problem: HTN (hypertension).   ·  Plan: Chronic  - Continue home lisinopril and metoprolol with hold parameters   - monitor hemodynamics.     Problem/Plan - 6:  ·  Problem: Need for prophylactic measure.   ·  Plan: VTE ppx: c/w lovenox.    
67yo F w/ PMH of anemia, HTN, severe AS s/p bovine TAVR, presents to the ED with right hip pain and inability to ambulate s/p mechanical fall, admitted with right-sided superior and inferior pubic rami fractures, right sacral ala fracture, now with suspected right UE cellulitis.         
67yo F w/pmhx anemia, HTN, s/p bovine aortic valve replacement, presents to the ED s/p fall. Admitted for pubic rami fx.   ID c/s for fever and persistent leukocytosis    R arm exam consistent w/ possible early cellulitis  - pt w/ sustained laceration to R elbow area, now w/ surrounding erythema and persistent swelling/pain  - imaging reviewed -- pt w/ sustained pubic rami fx  - BCx NGTD  Recommendations:  Clinically improving  C/w cefazolin 2gm q8h, last day today to complete x7 day course. Can d/c Abx on discharge  Appreciate ortho recs given pubic rami fx--no acute intervention  Pain control, supportive care and additional management per primary team    Stable from ID standpoint  D/c planning per primary team    Infectious Diseases will continue to follow. Please call with any questions.   Daniela Juarez M.D.  [facility] Division of Infectious Diseases [phone]  
69yo F w/pmhx anemia, HTN, s/p bovine aortic valve replacement, presents to the ED s/p fall. Admitted for pubic rami fx.   ID c/s for fever and persistent leukocytosis    R arm exam consistent w/ possible early cellulitis  - pt w/ sustained laceration to R elbow area, now w/ surrounding erythema and persistent swelling/pain  - imaging reviewed -- pt w/ sustained pubic rami fx  - BCx NGTD    Recommendations:  Clinically improving  C/w cefazolin 2gm q8h--can switch to PO keflex 500mg QID to complete x5-7 day course when ready for d/c  Appreciate ortho recs given pubic rami fx--no acute intervention  Pain control, supportive care and additional management per primary team    D/w Dr. Olesay Conroy from ID standpoint  D/c planning per primary team    Dr. Jerica Ambriz covering weekend service  Infectious Diseases will continue to follow. Please call with any questions.   Daniela Juarez M.D.  [facility] Division of Infectious Diseases [phone]  
69yo F w/ PMH of anemia, HTN, severe AS s/p bovine TAVR, presents to the ED with right hip pain and inability to ambulate s/p mechanical fall, admitted with right-sided superior and inferior pubic rami fractures, right sacral ala fracture, now with suspected right UE cellulitis.         
69yo F w/ PMH of anemia, HTN, severe AS s/p bovine TAVR, presents to the ED with right hip pain and inability to ambulate s/p mechanical fall, admitted with right-sided superior and inferior pubic rami fractures, right sacral ala fracture, course c/b suspected right UE cellulitis.         
67yo F w/ PMH of anemia, HTN, severe AS s/p bovine TAVR, presents to the ED with right hip pain and inability to ambulate s/p mechanical fall, admitted with right-sided superior and inferior pubic rami fractures, right sacral ala fracture, now with suspected right UE cellulitis.         
67yo F w/ PMH of anemia, HTN, severe AS s/p bovine TAVR, presents to the ED with right hip pain and inability to ambulate s/p mechanical fall, admitted with right-sided superior and inferior pubic rami fractures, right sacral ala fracture, now with suspected right UE cellulitis.         
67yo F w/ PMH of anemia, HTN, severe AS s/p bovine TAVR, presents to the ED with right hip pain and inability to ambulate s/p mechanical fall, admitted with right-sided superior and inferior pubic rami fractures, right sacral ala fracture, course c/b suspected right UE cellulitis.

## 2023-06-02 VITALS
TEMPERATURE: 98 F | RESPIRATION RATE: 18 BRPM | DIASTOLIC BLOOD PRESSURE: 67 MMHG | HEART RATE: 73 BPM | SYSTOLIC BLOOD PRESSURE: 109 MMHG | OXYGEN SATURATION: 94 %

## 2023-06-02 LAB
ANION GAP SERPL CALC-SCNC: 4 MMOL/L — LOW (ref 5–17)
BASOPHILS # BLD AUTO: 0.21 K/UL — HIGH (ref 0–0.2)
BASOPHILS NFR BLD AUTO: 1.6 % — SIGNIFICANT CHANGE UP (ref 0–2)
BUN SERPL-MCNC: 39 MG/DL — HIGH (ref 7–23)
CALCIUM SERPL-MCNC: 9.2 MG/DL — SIGNIFICANT CHANGE UP (ref 8.5–10.1)
CHLORIDE SERPL-SCNC: 105 MMOL/L — SIGNIFICANT CHANGE UP (ref 96–108)
CO2 SERPL-SCNC: 26 MMOL/L — SIGNIFICANT CHANGE UP (ref 22–31)
CREAT SERPL-MCNC: 0.68 MG/DL — SIGNIFICANT CHANGE UP (ref 0.5–1.3)
EGFR: 95 ML/MIN/1.73M2 — SIGNIFICANT CHANGE UP
EOSINOPHIL # BLD AUTO: 0.97 K/UL — HIGH (ref 0–0.5)
EOSINOPHIL NFR BLD AUTO: 7.2 % — HIGH (ref 0–6)
GLUCOSE SERPL-MCNC: 86 MG/DL — SIGNIFICANT CHANGE UP (ref 70–99)
HCT VFR BLD CALC: 26.1 % — LOW (ref 34.5–45)
HGB BLD-MCNC: 8 G/DL — LOW (ref 11.5–15.5)
IMM GRANULOCYTES NFR BLD AUTO: 1.4 % — HIGH (ref 0–0.9)
LYMPHOCYTES # BLD AUTO: 26.7 % — SIGNIFICANT CHANGE UP (ref 13–44)
LYMPHOCYTES # BLD AUTO: 3.58 K/UL — HIGH (ref 1–3.3)
MCHC RBC-ENTMCNC: 27.9 PG — SIGNIFICANT CHANGE UP (ref 27–34)
MCHC RBC-ENTMCNC: 30.7 GM/DL — LOW (ref 32–36)
MCV RBC AUTO: 90.9 FL — SIGNIFICANT CHANGE UP (ref 80–100)
MONOCYTES # BLD AUTO: 1.59 K/UL — HIGH (ref 0–0.9)
MONOCYTES NFR BLD AUTO: 11.9 % — SIGNIFICANT CHANGE UP (ref 2–14)
NEUTROPHILS # BLD AUTO: 6.86 K/UL — SIGNIFICANT CHANGE UP (ref 1.8–7.4)
NEUTROPHILS NFR BLD AUTO: 51.2 % — SIGNIFICANT CHANGE UP (ref 43–77)
NRBC # BLD: 0 /100 WBCS — SIGNIFICANT CHANGE UP (ref 0–0)
PLATELET # BLD AUTO: 200 K/UL — SIGNIFICANT CHANGE UP (ref 150–400)
POTASSIUM SERPL-MCNC: 4.5 MMOL/L — SIGNIFICANT CHANGE UP (ref 3.5–5.3)
POTASSIUM SERPL-SCNC: 4.5 MMOL/L — SIGNIFICANT CHANGE UP (ref 3.5–5.3)
RBC # BLD: 2.87 M/UL — LOW (ref 3.8–5.2)
RBC # FLD: 17 % — HIGH (ref 10.3–14.5)
SODIUM SERPL-SCNC: 135 MMOL/L — SIGNIFICANT CHANGE UP (ref 135–145)
WBC # BLD: 13.4 K/UL — HIGH (ref 3.8–10.5)
WBC # FLD AUTO: 13.4 K/UL — HIGH (ref 3.8–10.5)

## 2023-06-02 PROCEDURE — 87086 URINE CULTURE/COLONY COUNT: CPT

## 2023-06-02 PROCEDURE — 85027 COMPLETE CBC AUTOMATED: CPT

## 2023-06-02 PROCEDURE — 73552 X-RAY EXAM OF FEMUR 2/>: CPT

## 2023-06-02 PROCEDURE — P9016: CPT

## 2023-06-02 PROCEDURE — 84145 PROCALCITONIN (PCT): CPT

## 2023-06-02 PROCEDURE — 82607 VITAMIN B-12: CPT

## 2023-06-02 PROCEDURE — 86901 BLOOD TYPING SEROLOGIC RH(D): CPT

## 2023-06-02 PROCEDURE — 85045 AUTOMATED RETICULOCYTE COUNT: CPT

## 2023-06-02 PROCEDURE — 93005 ELECTROCARDIOGRAM TRACING: CPT

## 2023-06-02 PROCEDURE — 93970 EXTREMITY STUDY: CPT

## 2023-06-02 PROCEDURE — 86803 HEPATITIS C AB TEST: CPT

## 2023-06-02 PROCEDURE — 86923 COMPATIBILITY TEST ELECTRIC: CPT

## 2023-06-02 PROCEDURE — 36415 COLL VENOUS BLD VENIPUNCTURE: CPT

## 2023-06-02 PROCEDURE — 76376 3D RENDER W/INTRP POSTPROCES: CPT

## 2023-06-02 PROCEDURE — 86900 BLOOD TYPING SEROLOGIC ABO: CPT

## 2023-06-02 PROCEDURE — 86850 RBC ANTIBODY SCREEN: CPT

## 2023-06-02 PROCEDURE — 93306 TTE W/DOPPLER COMPLETE: CPT

## 2023-06-02 PROCEDURE — 99239 HOSP IP/OBS DSCHRG MGMT >30: CPT

## 2023-06-02 PROCEDURE — 85610 PROTHROMBIN TIME: CPT

## 2023-06-02 PROCEDURE — 72190 X-RAY EXAM OF PELVIS: CPT

## 2023-06-02 PROCEDURE — 72192 CT PELVIS W/O DYE: CPT | Mod: MA

## 2023-06-02 PROCEDURE — 84466 ASSAY OF TRANSFERRIN: CPT

## 2023-06-02 PROCEDURE — 97530 THERAPEUTIC ACTIVITIES: CPT

## 2023-06-02 PROCEDURE — 71045 X-RAY EXAM CHEST 1 VIEW: CPT

## 2023-06-02 PROCEDURE — 96374 THER/PROPH/DIAG INJ IV PUSH: CPT

## 2023-06-02 PROCEDURE — 73110 X-RAY EXAM OF WRIST: CPT

## 2023-06-02 PROCEDURE — 73030 X-RAY EXAM OF SHOULDER: CPT

## 2023-06-02 PROCEDURE — 82746 ASSAY OF FOLIC ACID SERUM: CPT

## 2023-06-02 PROCEDURE — 97166 OT EVAL MOD COMPLEX 45 MIN: CPT

## 2023-06-02 PROCEDURE — 83036 HEMOGLOBIN GLYCOSYLATED A1C: CPT

## 2023-06-02 PROCEDURE — 72125 CT NECK SPINE W/O DYE: CPT | Mod: MG

## 2023-06-02 PROCEDURE — 83540 ASSAY OF IRON: CPT

## 2023-06-02 PROCEDURE — 97535 SELF CARE MNGMENT TRAINING: CPT

## 2023-06-02 PROCEDURE — 74176 CT ABD & PELVIS W/O CONTRAST: CPT

## 2023-06-02 PROCEDURE — 82728 ASSAY OF FERRITIN: CPT

## 2023-06-02 PROCEDURE — 73502 X-RAY EXAM HIP UNI 2-3 VIEWS: CPT

## 2023-06-02 PROCEDURE — 82272 OCCULT BLD FECES 1-3 TESTS: CPT

## 2023-06-02 PROCEDURE — 80053 COMPREHEN METABOLIC PANEL: CPT

## 2023-06-02 PROCEDURE — 80048 BASIC METABOLIC PNL TOTAL CA: CPT

## 2023-06-02 PROCEDURE — 73120 X-RAY EXAM OF HAND: CPT

## 2023-06-02 PROCEDURE — 82550 ASSAY OF CK (CPK): CPT

## 2023-06-02 PROCEDURE — 99285 EMERGENCY DEPT VISIT HI MDM: CPT | Mod: 25

## 2023-06-02 PROCEDURE — 84100 ASSAY OF PHOSPHORUS: CPT

## 2023-06-02 PROCEDURE — 85018 HEMOGLOBIN: CPT

## 2023-06-02 PROCEDURE — 87040 BLOOD CULTURE FOR BACTERIA: CPT

## 2023-06-02 PROCEDURE — 97162 PT EVAL MOD COMPLEX 30 MIN: CPT

## 2023-06-02 PROCEDURE — 85730 THROMBOPLASTIN TIME PARTIAL: CPT

## 2023-06-02 PROCEDURE — 97110 THERAPEUTIC EXERCISES: CPT

## 2023-06-02 PROCEDURE — 81001 URINALYSIS AUTO W/SCOPE: CPT

## 2023-06-02 PROCEDURE — 85014 HEMATOCRIT: CPT

## 2023-06-02 PROCEDURE — 83735 ASSAY OF MAGNESIUM: CPT

## 2023-06-02 PROCEDURE — 83550 IRON BINDING TEST: CPT

## 2023-06-02 PROCEDURE — 70450 CT HEAD/BRAIN W/O DYE: CPT | Mod: MG

## 2023-06-02 PROCEDURE — 85025 COMPLETE CBC W/AUTO DIFF WBC: CPT

## 2023-06-02 PROCEDURE — 87635 SARS-COV-2 COVID-19 AMP PRB: CPT

## 2023-06-02 PROCEDURE — G1004: CPT

## 2023-06-02 RX ORDER — ENOXAPARIN SODIUM 100 MG/ML
40 INJECTION SUBCUTANEOUS
Qty: 0 | Refills: 0 | DISCHARGE
Start: 2023-06-02

## 2023-06-02 RX ORDER — ACETAMINOPHEN 500 MG
2 TABLET ORAL
Qty: 0 | Refills: 0 | DISCHARGE
Start: 2023-06-02

## 2023-06-02 RX ORDER — OXYCODONE AND ACETAMINOPHEN 5; 325 MG/1; MG/1
1 TABLET ORAL
Qty: 0 | Refills: 0 | DISCHARGE
Start: 2023-06-02

## 2023-06-02 RX ORDER — OXYCODONE AND ACETAMINOPHEN 5; 325 MG/1; MG/1
2 TABLET ORAL
Qty: 0 | Refills: 0 | DISCHARGE
Start: 2023-06-02

## 2023-06-02 RX ORDER — SENNA PLUS 8.6 MG/1
2 TABLET ORAL
Qty: 0 | Refills: 0 | DISCHARGE

## 2023-06-02 RX ORDER — POLYETHYLENE GLYCOL 3350 17 G/17G
17 POWDER, FOR SOLUTION ORAL
Qty: 0 | Refills: 0 | DISCHARGE

## 2023-06-02 RX ADMIN — Medication 325 MILLIGRAM(S): at 12:08

## 2023-06-02 RX ADMIN — Medication 250 MILLIGRAM(S): at 06:11

## 2023-06-02 RX ADMIN — LISINOPRIL 10 MILLIGRAM(S): 2.5 TABLET ORAL at 05:40

## 2023-06-02 RX ADMIN — Medication 650 MILLIGRAM(S): at 09:03

## 2023-06-02 RX ADMIN — Medication 25 MILLIGRAM(S): at 05:40

## 2024-06-24 ENCOUNTER — EMERGENCY (EMERGENCY)
Facility: HOSPITAL | Age: 69
LOS: 1 days | Discharge: HOME | End: 2024-06-24
Attending: PERSONAL EMERGENCY RESPONSE ATTENDANT | Admitting: PERSONAL EMERGENCY RESPONSE ATTENDANT
Payer: MEDICARE

## 2024-06-24 VITALS
HEART RATE: 102 BPM | OXYGEN SATURATION: 94 % | DIASTOLIC BLOOD PRESSURE: 77 MMHG | SYSTOLIC BLOOD PRESSURE: 168 MMHG | WEIGHT: 166.89 LBS | TEMPERATURE: 99 F | RESPIRATION RATE: 20 BRPM

## 2024-06-24 DIAGNOSIS — Z95.2 PRESENCE OF PROSTHETIC HEART VALVE: Chronic | ICD-10-CM

## 2024-06-24 LAB
BASOPHILS # BLD AUTO: 0.18 K/UL — SIGNIFICANT CHANGE UP (ref 0–0.2)
BASOPHILS NFR BLD AUTO: 1.7 % — SIGNIFICANT CHANGE UP (ref 0–2)
EOSINOPHIL # BLD AUTO: 0.23 K/UL — SIGNIFICANT CHANGE UP (ref 0–0.5)
EOSINOPHIL NFR BLD AUTO: 2.2 % — SIGNIFICANT CHANGE UP (ref 0–6)
HCT VFR BLD CALC: 27.3 % — LOW (ref 34.5–45)
HGB BLD-MCNC: 9 G/DL — LOW (ref 11.5–15.5)
IANC: 5.46 K/UL — SIGNIFICANT CHANGE UP (ref 1.8–7.4)
IMM GRANULOCYTES NFR BLD AUTO: 0.4 % — SIGNIFICANT CHANGE UP (ref 0–0.9)
LYMPHOCYTES # BLD AUTO: 29.1 % — SIGNIFICANT CHANGE UP (ref 13–44)
LYMPHOCYTES # BLD AUTO: 3.03 K/UL — SIGNIFICANT CHANGE UP (ref 1–3.3)
MCHC RBC-ENTMCNC: 28.7 PG — SIGNIFICANT CHANGE UP (ref 27–34)
MCHC RBC-ENTMCNC: 33 GM/DL — SIGNIFICANT CHANGE UP (ref 32–36)
MCV RBC AUTO: 86.9 FL — SIGNIFICANT CHANGE UP (ref 80–100)
MONOCYTES # BLD AUTO: 1.46 K/UL — HIGH (ref 0–0.9)
MONOCYTES NFR BLD AUTO: 14 % — SIGNIFICANT CHANGE UP (ref 2–14)
NEUTROPHILS # BLD AUTO: 5.46 K/UL — SIGNIFICANT CHANGE UP (ref 1.8–7.4)
NEUTROPHILS NFR BLD AUTO: 52.6 % — SIGNIFICANT CHANGE UP (ref 43–77)
NRBC # BLD: 0 /100 WBCS — SIGNIFICANT CHANGE UP (ref 0–0)
NRBC # FLD: 0 K/UL — SIGNIFICANT CHANGE UP (ref 0–0)
PLATELET # BLD AUTO: 181 K/UL — SIGNIFICANT CHANGE UP (ref 150–400)
RBC # BLD: 3.14 M/UL — LOW (ref 3.8–5.2)
RBC # FLD: 17.1 % — HIGH (ref 10.3–14.5)
WBC # BLD: 10.4 K/UL — SIGNIFICANT CHANGE UP (ref 3.8–10.5)
WBC # FLD AUTO: 10.4 K/UL — SIGNIFICANT CHANGE UP (ref 3.8–10.5)

## 2024-06-24 PROCEDURE — 99285 EMERGENCY DEPT VISIT HI MDM: CPT | Mod: GC

## 2024-06-24 PROCEDURE — 93971 EXTREMITY STUDY: CPT | Mod: 26,LT

## 2024-06-24 RX ORDER — ACETAMINOPHEN 500 MG
1000 TABLET ORAL ONCE
Refills: 0 | Status: COMPLETED | OUTPATIENT
Start: 2024-06-24 | End: 2024-06-24

## 2024-06-24 RX ORDER — KETOROLAC TROMETHAMINE 30 MG/ML
15 SYRINGE (ML) INJECTION ONCE
Refills: 0 | Status: DISCONTINUED | OUTPATIENT
Start: 2024-06-24 | End: 2024-06-24

## 2024-06-24 RX ADMIN — Medication 400 MILLIGRAM(S): at 23:22

## 2024-06-24 RX ADMIN — Medication 15 MILLIGRAM(S): at 23:22

## 2024-06-24 NOTE — ED ADULT NURSE NOTE - OBJECTIVE STATEMENT
pt. received to room 12 A&Ox4 presenting s/p dental procedure of excision of x13 teeth. pt. unable to talk at this time, information provided via daughter, states that patient had "infections" and was getting ready for heart surgery by removing these teeth. pt. endorses generalized mouth pain, bruising,  no drooling noted. NAD noted. respirations even and unlabored on RA. 20g IV placed in the R AC. labs drawn and sent. due meds given. comfort measures provided. safety precautions maintained.

## 2024-06-24 NOTE — ED ADULT TRIAGE NOTE - CHIEF COMPLAINT QUOTE
presents S/P 13 extractions 6/20. C/O bleeding and pain and swelling to mouth. on ASA. Phx anemia aortic stenosis.  HTN

## 2024-06-24 NOTE — ED ADULT NURSE NOTE - CHIEF COMPLAINT QUOTE
presents S/P 13 extractions 6/20. C/O bleeding and pain and swelling to mouth. on ASA. Phx anemia aortic stenosis.  HTN Consent: The rationale for the repair was explained to the patient and consent was obtained. The risks, benefits and alternatives to therapy were discussed in detail. Specifically, the risks of infection, scarring, bleeding, prolonged wound healing, incomplete removal, allergy to anesthesia, nerve injury and recurrence were addressed. Prior to the procedure, the treatment site was clearly identified and confirmed by the patient. All components of Universal Protocol/PAUSE Rule completed.

## 2024-06-24 NOTE — ED ADULT NURSE NOTE - IN ACCORDANCE WITH [place] STATE LAW, WE OFFER EVERY PATIENT A HEPATITIS C TEST. WOULD YOU LIKE TO BE TESTED TODAY?
Opt out Clindamycin Pregnancy And Lactation Text: This medication can be used in pregnancy if certain situations. Clindamycin is also present in breast milk.

## 2024-06-25 VITALS
OXYGEN SATURATION: 100 % | HEART RATE: 70 BPM | SYSTOLIC BLOOD PRESSURE: 114 MMHG | DIASTOLIC BLOOD PRESSURE: 56 MMHG | TEMPERATURE: 98 F | RESPIRATION RATE: 16 BRPM

## 2024-06-25 PROBLEM — I10 ESSENTIAL (PRIMARY) HYPERTENSION: Chronic | Status: ACTIVE | Noted: 2023-05-24

## 2024-06-25 PROBLEM — D64.9 ANEMIA, UNSPECIFIED: Chronic | Status: ACTIVE | Noted: 2023-05-24

## 2024-06-25 LAB
ALBUMIN SERPL ELPH-MCNC: 3.7 G/DL — SIGNIFICANT CHANGE UP (ref 3.3–5)
ALP SERPL-CCNC: 78 U/L — SIGNIFICANT CHANGE UP (ref 40–120)
ALT FLD-CCNC: 13 U/L — SIGNIFICANT CHANGE UP (ref 4–33)
ANION GAP SERPL CALC-SCNC: 12 MMOL/L — SIGNIFICANT CHANGE UP (ref 7–14)
APTT BLD: 32.2 SEC — SIGNIFICANT CHANGE UP (ref 24.5–35.6)
AST SERPL-CCNC: 29 U/L — SIGNIFICANT CHANGE UP (ref 4–32)
BILIRUB SERPL-MCNC: 1.1 MG/DL — SIGNIFICANT CHANGE UP (ref 0.2–1.2)
BLD GP AB SCN SERPL QL: POSITIVE — SIGNIFICANT CHANGE UP
BUN SERPL-MCNC: 30 MG/DL — HIGH (ref 7–23)
CALCIUM SERPL-MCNC: 9.7 MG/DL — SIGNIFICANT CHANGE UP (ref 8.4–10.5)
CHLORIDE SERPL-SCNC: 95 MMOL/L — LOW (ref 98–107)
CO2 SERPL-SCNC: 22 MMOL/L — SIGNIFICANT CHANGE UP (ref 22–31)
CREAT SERPL-MCNC: 0.9 MG/DL — SIGNIFICANT CHANGE UP (ref 0.5–1.3)
EGFR: 70 ML/MIN/1.73M2 — SIGNIFICANT CHANGE UP
GLUCOSE SERPL-MCNC: 98 MG/DL — SIGNIFICANT CHANGE UP (ref 70–99)
INR BLD: 1.05 RATIO — SIGNIFICANT CHANGE UP (ref 0.85–1.18)
POTASSIUM SERPL-MCNC: 4.8 MMOL/L — SIGNIFICANT CHANGE UP (ref 3.5–5.3)
POTASSIUM SERPL-SCNC: 4.8 MMOL/L — SIGNIFICANT CHANGE UP (ref 3.5–5.3)
PROT SERPL-MCNC: 8.1 G/DL — SIGNIFICANT CHANGE UP (ref 6–8.3)
PROTHROM AB SERPL-ACNC: 11.8 SEC — SIGNIFICANT CHANGE UP (ref 9.5–13)
RH IG SCN BLD-IMP: POSITIVE — SIGNIFICANT CHANGE UP
SODIUM SERPL-SCNC: 129 MMOL/L — LOW (ref 135–145)

## 2024-06-25 PROCEDURE — 86077 PHYS BLOOD BANK SERV XMATCH: CPT

## 2024-06-25 PROCEDURE — 70491 CT SOFT TISSUE NECK W/DYE: CPT | Mod: 26,MC

## 2024-06-25 NOTE — ED PROVIDER NOTE - CCCP TRG CHIEF CMPLNT
post operative complication Doxycycline Pregnancy And Lactation Text: This medication is Pregnancy Category D and not consider safe during pregnancy. It is also excreted in breast milk but is considered safe for shorter treatment courses.

## 2024-06-25 NOTE — ED PROVIDER NOTE - PATIENT PORTAL LINK FT
You can access the FollowMyHealth Patient Portal offered by [facility] by registering at the following website: [email or web address]. By joining [employer]’s FollowMyHealth portal, you will also be able to view your health information using other applications (apps) compatible with our system.

## 2024-06-25 NOTE — ED PROVIDER NOTE - CLINICAL SUMMARY MEDICAL DECISION MAKING FREE TEXT BOX
68 yo F pmhx anemia, HTN, severe AS s/p bovine TAVR, Recently at outside hospital for evaluation of new aortic valve had multiple teeth removed 6/20 presents with  bleeding gums swelling and pain.   Bleeding worsens tonight.  Patient spitting up blood.  Also reporting worsening pain and swelling and bruising to the right sided neck.  Decreased p.o. intake.   Not on AC.  Patient also reporting tactile fevers.  No changes in voice.  Able to tolerate p.o.  No chest pain or shortness of breath.  Patient also having right leg swelling and pain to right lateral thigh.  On arrival vital signs stable.  Exam well-appearing female no acute distress.  Alert and oriented answering questions appropriately.  Oropharynx with multiple teeth status post resection.  No active bleeding.  No blood in the posterior oropharynx.  Swelling and bruising to right sided face and neck.  No hematoma appreciated.   Right lower extremity with tenderness to palpation right  lateral thigh.  No swelling or tenderness to palpation.   Presentation concerning for  hematoma versus active bleed posteriorly versus hematoma in the neck.   will evaluate for anemia given bleeding.  Will evaluate for coagulopathy. will eval for dvt.  Plan for labs CT neck analgesia ultrasound and reassess.  OMFS consult pending imaging.

## 2024-06-25 NOTE — CONSULT NOTE ADULT - ASSESSMENT
69F with PMH anemia, HTN, and TAVR presents to [facility] ED 5 days s/p extraction of 13 teeth at [facility] with generalized facial swelling and hemostatic surgical sites progressing well in the post-operative period. On exam, extraction sites are hemostatic with all sutures intact and surgical sites pink and well perfused.     Plan:   -No OMFS intervention indicated at this time.   -Return to ED immediately if bleeding excessively or if patient becomes symptomatic  -F/u with outside OMFS or with [facility] OMFS as needed    Barry Allen  Oral and Maxillofacial Surgery  [facility] OMFS Pager #[phone]  [facility] Pager: [phone]  [facility] Pager: [phone]  Available on Teams

## 2024-06-25 NOTE — CONSULT NOTE ADULT - SUBJECTIVE AND OBJECTIVE BOX
69F with PMH anemia, HTN, severe AS s/p bovine TAVR, Recently at outside hospital for evaluation of new aortic valve had multiple teeth removed 6/20 presents with bleeding gums swelling and pain, which began following removal by patient of intraoral blood clot from extraction site. Pt denies being on AC. No chest pain or shortness of breath, dysphagia, dyspnea, or CN VII neuro deficits. Pt endorses CN V3 hypothesia on R.      OMFS consulted for continued bleeding following extractions.    Vital Signs Last 24 Hrs  T(C): 36.7 (25 Jun 2024 05:10), Max: 37.2 (24 Jun 2024 21:08)  T(F): 98 (25 Jun 2024 05:10), Max: 98.9 (24 Jun 2024 21:08)  HR: 70 (25 Jun 2024 05:10) (70 - 102)  BP: 114/56 (25 Jun 2024 05:10) (96/50 - 168/77)  BP(mean): 74 (25 Jun 2024 05:10) (74 - 74)  RR: 16 (25 Jun 2024 05:10) (16 - 20)  SpO2: 100% (25 Jun 2024 05:10) (94% - 100%)    Parameters below as of 25 Jun 2024 05:10  Patient On (Oxygen Delivery Method): room air    Labs:                        9.0    10.40 )-----------( 181      ( 24 Jun 2024 23:26 )             27.3       06-24    129<L>  |  95<L>  |  30<H>  ----------------------------<  98  4.8   |  22  |  0.90    Ca    9.7      24 Jun 2024 23:26    TPro  8.1  /  Alb  3.7  /  TBili  1.1  /  DBili  x   /  AST  29  /  ALT  13  /  AlkPhos  78  06-24    Physical Exam:   General: AAOx3, NAD  H: Generalized facial edema and bruising. No step deformities notes, tenderness to palpation of R cheek             Intraoral: Fair oral hygiene, FOM soft/NT/NE, all extraction sites hemostatic with sutures c/d/i, no draining purulence  E: PERRLA, EOMI  E: Hearing grossly intact, no otorrhea  N: No septal hematoma, no crepitus, no epistaxis  T: Trachea midline  Neuro: No VII neuro deficits. Pt endorses R CN V3 hypothesia

## 2024-06-25 NOTE — ED PROVIDER NOTE - PROGRESS NOTE DETAILS
Lilliana SCHMITZ PGY3: Hemoglobin stable.  CT findings discussed with OMFS.  They evaluated patient safe for discharge from their evaluation.   Patient hemodynamically stable.  No active bleeding on exam.  Safe for discharge with strict return precautions and OMFS follow-up.  Patient and daughter in agreement with plan.

## 2024-06-25 NOTE — ED ADULT NURSE REASSESSMENT NOTE - NS ED NURSE REASSESS COMMENT FT1
Patient is AOx4 and in no signs of acute distress. Respirations even and unlabored, chest rise symmetrical b/l. Patient pending OMF consult. Comfort measures maintained. Patient appears comfortable on stretcher. Bed in lowest position. Safety Maintained.

## 2024-06-25 NOTE — ED PROVIDER NOTE - NSFOLLOWUPINSTRUCTIONS_ED_ALL_ED_FT
-Please take Tylenol up to 650 mg every 6 hours as needed for pain and/or Motrin up to 600 mg every 8 hours as needed for pain    -Please take any prescribed medications as instructed by your PMD    - Be sure to return to the ED if you develop new or worsening symptoms. Specific signs and symptoms to be vigilant of: fever or chills, chest pain, difficulty breathing, palpitations, loss of consciousness, headache, vision changes, slurred speech, difficulty swallowing or drooling, facial droop, weakness in the arms or legs, numbness or tingling, abdominal pain, nausea or vomiting, diarrhea, constipation, blood in the stool or urine, pain on urination, difficulty urinating.    Please follow up with your oral surgeon within 1 week.

## 2024-06-25 NOTE — ED ADULT NURSE REASSESSMENT NOTE - NS ED NURSE REASSESS COMMENT FT1
Report received from RACHAEL Goddard. Patient is AOx4 and in no signs of acute distress. VS noted. Respirations even and unlabored, chest rise symmetrical b/l. Patient pending CT of neck. Comfort measures maintained. Bed in lowest position. Safety Maintained.

## 2025-06-03 NOTE — ED PROVIDER NOTE - CARE PLAN
1 Home Principal Discharge DX:	Closed fracture of multiple pubic rami, right, initial encounter  Secondary Diagnosis:	Unable to ambulate